# Patient Record
Sex: FEMALE | Race: WHITE | Employment: OTHER | URBAN - METROPOLITAN AREA
[De-identification: names, ages, dates, MRNs, and addresses within clinical notes are randomized per-mention and may not be internally consistent; named-entity substitution may affect disease eponyms.]

---

## 2017-02-06 LAB
CREATININE, EXTERNAL: 0.8
LDL-C, EXTERNAL: 242
Lab: >60

## 2017-02-09 ENCOUNTER — HOSPITAL ENCOUNTER (OUTPATIENT)
Dept: ULTRASOUND IMAGING | Age: 67
Discharge: HOME | End: 2017-02-09
Attending: FAMILY MEDICINE
Payer: COMMERCIAL

## 2017-02-09 DIAGNOSIS — E04.9 GOITER: ICD-10-CM

## 2017-02-09 PROCEDURE — 76536 US EXAM OF HEAD AND NECK: CPT

## 2017-04-04 ENCOUNTER — OFFICE VISIT (OUTPATIENT)
Dept: ENDOCRINOLOGY | Age: 67
End: 2017-04-04

## 2017-04-04 VITALS
WEIGHT: 145.9 LBS | BODY MASS INDEX: 24.31 KG/M2 | SYSTOLIC BLOOD PRESSURE: 124 MMHG | DIASTOLIC BLOOD PRESSURE: 83 MMHG | HEIGHT: 65 IN | HEART RATE: 71 BPM

## 2017-04-04 DIAGNOSIS — E04.2 MULTINODULAR GOITER: Primary | ICD-10-CM

## 2017-04-04 LAB — TSH SERPL DL<=0.005 MIU/L-ACNC: 2.37 M[IU]/L

## 2017-04-04 RX ORDER — PRAVASTATIN SODIUM 10 MG/1
TABLET ORAL
COMMUNITY
End: 2018-12-10

## 2017-04-04 RX ORDER — CALCIUM CARBONATE 600 MG
600 TABLET ORAL DAILY
COMMUNITY

## 2017-04-04 RX ORDER — LANOLIN ALCOHOL/MO/W.PET/CERES
1 CREAM (GRAM) TOPICAL DAILY
COMMUNITY

## 2017-04-04 RX ORDER — GUAIFENESIN 100 MG/5ML
81 LIQUID (ML) ORAL DAILY
COMMUNITY

## 2017-04-04 NOTE — PROGRESS NOTES
CONSULTATION REQUESTED BY: Prabha Newton MD     REASON FOR CONSULT: Evaluation of thyroid nodule    CHIEF COMPLAINT: Thyroid nodule    HISTORY OF PRESENT ILLNESS:   Lulu Laura is a 61 y.o. female with a PMHx as noted below who was referred to our endocrinology clinic for evaluation of a thyroid nodule. Patient describes that she has had thyroid nodules followed for many years. Her doctor had initially palpated her neck and obtained an ultrasound for review which revealed a multinodular goiter. She has since had this followed with ultrasounds and the most recent one noted as below. Recent thyroid ultrasound was obtained and dated: 2/9/17  Findings:  Right lobe 4.8cm in length  Left lobe 5.6 cm in length  Right superior 10 x 7 x 11 mm nodule  Left lower lobe 1.8 x 1 x 1.6 cm nodule (was 1.5 cm in 2014)  No microcalcifications reported    Family history is not significant for thyroid nodules or thyroid cancers. Patient denies any history of radiation exposure. They deny any dysphagia or dyspnea. Review of thyroid labs dated: 2/6/17  TSH: 2.37  Patient denies symptoms of hyper or hypothyroidism. At this time they would like to further investigate the nature of the thyroid nodule. PAST MEDICAL/SURGICAL HISTORY:   Past Medical History:   Diagnosis Date    Colonic polyp     Goiter     Hyperlipidemia      Past Surgical History:   Procedure Laterality Date    HX COLONOSCOPY      HX KNEE ARTHROSCOPY         ALLERGIES:   No Known Allergies    MEDICATIONS ON ADMISSION:     Current Outpatient Prescriptions:     aspirin 81 mg chewable tablet, Take 81 mg by mouth daily. , Disp: , Rfl:     Omega-3 Fatty Acids (FISH OIL) 300 mg cap, Take  by mouth., Disp: , Rfl:     calcium carbonate (CALTREX) 600 mg calcium (1,500 mg) tablet, Take 600 mg by mouth two (2) times a day., Disp: , Rfl:     glucosamine-chondroitin (ARTHX) 500-400 mg cap, Take 1 Cap by mouth daily. , Disp: , Rfl:     pravastatin (PRAVACHOL) 10 mg tablet, Take  by mouth nightly., Disp: , Rfl:     SOCIAL HISTORY:   Social History     Social History    Marital status:      Spouse name: N/A    Number of children: N/A    Years of education: N/A     Occupational History    Not on file. Social History Main Topics    Smoking status: Never Smoker    Smokeless tobacco: Not on file    Alcohol use Not on file    Drug use: Not on file    Sexual activity: Not on file     Other Topics Concern    Not on file     Social History Narrative       FAMILY HISTORY:  Family History   Problem Relation Age of Onset    Hypertension Mother     Heart Disease Mother     Cancer Father     Diabetes Father     Heart Disease Father        REVIEW OF SYSTEMS: Complete ROS assessed and noted for that which is described above, all else are negative. Eyes: normal  ENT: normal  CVS: normal  Resp: normal  GI: normal  : normal  GYN: normal  Endocrine: normal  Integument: normal  Musculoskeletal: normal  Neuro: normal  Psych: normal      PHYSICAL EXAMINATION:    VITAL SIGNS:  Visit Vitals    /83 (BP 1 Location: Left arm, BP Patient Position: Sitting)    Pulse 71    Ht 5' 5\" (1.651 m)    Wt 145 lb 14.4 oz (66.2 kg)    BMI 24.28 kg/m2       GENERAL: NCAT, Sitting comfortably, NAD  EYES: EOMI, non-icteric, no proptosis  Ear/Nose/Throat: NCAT, no inflammation, thyroid gland is not appreciably large on exam, nodularity is not appreciated  LYMPH NODES: No LAD  CARDIOVASCULAR: S1 S2, RRR, No murmur, 2+ radial pulses  RESPIRATORY: CTA b/l, no wheeze/rales  GASTROINTESTINAL: NT, ND  MUSCULOSKELETAL: Normal ROM, no atrophy  SKIN: warm, no edema/rash/ or other skin changes  NEUROLOGIC: 5/5 power all extremities, AAOx3, no tremors  PSYCHIATRIC: Normal affect, Normal insight and judgement      REVIEW OF LABORATORY AND RADIOLOGY DATA:   Labs and documentation have been reviewed as described above. ASSESSMENT AND PLAN:   Kenzie Cameron is a 61 y.o. female with a PMHx as noted above who was referred to our endocrinology clinic for evaluation of a thyroid nodule. Multinodular goiter    Today we discussed the incidence of thyroid nodules in the community and the natural history of most thyroid nodules. We also discussed the proper workup and evaluation for thyroid nodules and management based upon the clinical features and sonographic patterns which may suggest benign vs more concerning nodules. We discussed the role of evaluating thyroid function to assess the impact of thyroid hormone levels on nodular size and to evaluate for autonomous toxic nodules, as this would also affect management decisions. Ultimately we also discussed the role of thyroid biopsies should that become indicated. Today based on our discussion, and having reviewed the images together in clinic, we have decided to move forward and biopsy the 1.8cm nodule in the left lower lobe as it has slowly grown over the years, and this would offer her some reassurance as we continue surveillance in the future. The nodule is not creating any discomfort and her thyroid function remains normal.    Plan:  Biopsy of 1.8 cm left lower lobe nodule  Plan for 1 year f/u if result is benign, will schedule the f/u ultrasound after result of biopsy  Patient advised to call with any concerns    Dionisio Cartwright.  [id number] [facility]

## 2017-04-04 NOTE — PATIENT INSTRUCTIONS
Regarding thyroid nodules as according to the [employer]:    Thyroid Nodule FAQs    WHAT ARE THE SYMPTOMS OF A THYROID NODULE? Most thyroid nodules do not cause symptoms. Often, thyroid nodules are discovered incidentally during a routine physical examination or on imaging tests like CT scans or neck ultrasound done for completely unrelated reasons. Occasionally, patients themselves find thyroid nodules by noticing a lump in their neck while looking in a mirror, buttoning their collar, or fastening a necklace. Abnormal thyroid function tests may occasionally be the reason a thyroid nodule is found. Thyroid nodules may produce excess amounts of thyroid hormone causing hyperthyroidism (see the Hyperthyroidism brochure). However, most thyroid nodules, including those that cancerous, are actually non-functioning, meaning tests like TSH are normal. Rarely, patients with thyroid nodules may complain of pain in the neck, jaw, or ear. If a nodule is large enough to compress the windpipe or esophagus, it may cause difficulty with breathing, swallowing, or cause a tickle in the throat. Even less commonly, hoarseness can be caused if the nodule invades the nerve that controls the vocal cords but this is usually related to thyroid cancer. The important points to remember are the following:  Thyroid nodules generally do not cause symptoms. Thyroid tests are most typically normal--even when cancer is present in a nodule. The best way to find a thyroid nodule is to make sure your doctor checks your neck! WHAT CAUSES THYROID NODULES AND HOW COMMON ARE THEY? We do not know what causes most thyroid nodules but they are extremely common. By age 61, about one-half of all people have a thyroid nodule that can be found either through examination or with imaging. Fortunately, over 90% of such nodules are benign.  Hashimotos thyroiditis, which is the most common cause of hypothyroidism (see Hypothyroidism brochure), is associated with an increased risk of thyroid nodules. Iodine deficiency, which is very uncommon in the [place], is also known to cause thyroid nodules. HOW IS A THYROID NODULE EVALUATED AND DIAGNOSED? Once the nodule is discovered, your doctor will try to determine whether the rest of your thyroid is healthy or whether the entire thyroid gland has been affected by a more general condition such as hyperthyroidism or hypothyroidism. Your physician will feel the thyroid to see whether the entire gland is enlarged and whether a single or multiple nodules are present. The initial laboratory tests may include measurement of thyroid hormone (thyroxine, or T4) and thyroid-stimulating hormone (TSH) in your blood to determine whether your thyroid is functioning normally. Since its usually not possible to determine whether a thyroid nodule is cancerous by physical examination and blood tests alone, the evaluation of the thyroid nodules often includes specialized tests such as thyroid ultrasonography and fine needle biopsy. THYROID ULTRASOUND:  Thyroid ultrasound is a key tool for thyroid nodule evaluation. It uses high-frequency sound waves to obtain a picture of the thyroid. This very accurate test can easily determine if a nodule is solid or fluid filled (cystic), and it can determine the precise size of the nodule. Ultrasound can help identify suspicious nodules since some ultrasound characteristics of thyroid nodules are more frequent in thyroid cancer than in noncancerous nodules. Thyroid ultrasound can identify nodules that are too small to feel during a physical examination. Ultrasound can also be used to accurately guide a needle directly into a nodule when your doctor thinks a fine needle biopsy is needed.  Once the initial evaluation is completed, thyroid ultrasound can be used to keep an eye on thyroid nodules that do not require surgery to determine if they are growing or shrinking over time. The ultrasound is a painless test which many doctors may be able to perform in their own office. THYROID FINE NEEDLE ASPIRATION BIOPSY (FNA OR FNAB):  A fine needle biopsy of a thyroid nodule may sound frightening, but the needle used is very small and a local anesthetic may not even be necessary. This simple procedure is often done in the doctors office. Sometimes, medications like blood thinners may need to be stopped for a few days before to the procedure. Otherwise, the biopsy does not usually require any other special preparation (no fasting). Patients typically return home or to work after the biopsy without even needing a band-aid! For a fine needle biopsy, your doctor will use a very thin needle to withdraw cells from the thyroid nodule. Ordinarily, several samples will be taken from different parts of the nodule to give your doctor the best chance of finding cancerous cells if they are present. The cells are then examined under a microscope by a pathologist.    The report of a thyroid fine needle biopsy will usually indicate one of the following findings: The nodule is benign (noncancerous). This result is obtained in up to 80% of biopsies. The risk of overlooking a cancer when the biopsy is benign is generally less than 3 in 100 tests or 3%. This is even lower when the biopsy is reviewed by an experienced pathologist at a [facility] center. Generally, benign thyroid nodules do not need to be removed unless they are causing symptoms like choking or difficulty swallowing. Follow up ultrasound exams are important. Occasionally, another biopsy may be required in the future, especially if the nodule grows over time. The nodule is malignant (cancerous) or suspicious for malignancy . malignant result is obtained in about 5% of biopsies and is most often due to papillary cancer, which is the most common type of thyroid cancer. A suspicious biopsy has a 50-75% risk of cancer in the nodule. These diagnoses require surgical removal of the thyroid after consultation with your endocrinologist and surgeon. The nodule is indeterminate. This is actually a group of several diagnoses that may occur in up to 20% of cases. An Indeterminate finding means that even though an adequate number of cells was removed during the fine needle biopsy, examination with a microscope cannot reliably classify the result as benign or cancer. The biopsy may be indeterminate because the nodule is described as a Follicular Lesion. These nodules are cancerous 20- 30% of the time. However, the diagnosis can only be made by surgery. Since the odds that the nodule is not a cancer are much better here (70-80%), only the side of the thyroid with the nodule is usually removed. If a cancer is found, the remaining thyroid gland usually must be removed as well. If the surgery confirms that no cancer is present, no additional surgery to complete the thyroidectomy is necessary. The biopsy may also be indeterminate because the cells from the nodule have features that cannot be placed in one of the other diagnostic categories. This diagnosis is called atypia, or a follicular lesion of undetermined significance. Diagnoses in this category will contain cancer rarely, so repeat evaluation with FNA or surgical biopsy to remove half of the thyroid containing the nodule is usually recommended. The biopsy may also be nondiagnostic or inadequate. This result is obtained in less than 5% of cases when an ultrasound is used to guide the FNA. This result indicates that not enough cells were obtained to make a diagnosis but is a common result if the nodule is a cyst. These nodules may require reevaluation with second fine needle biopsy, or may need to be removed surgically depending on the clinical judgment of your doctor. NUCLEAR THYROID SCANS:  Nuclear scanning of the thyroid was frequently done in the past to evaluate thyroid nodules.  However, use of thyroid ultrasound and biopsy have proven so accurate and sensitive, nuclear scanning is no longer considered a first-line method of evaluation. Nuclear scanning still has an important role in the evaluation of rare nodules that cause hyperthyroidism. In this situation, the nuclear thyroid scan may suggest that no further evaluation or biopsy is needed. In most other situations, neck ultrasound and biopsy remain the best and most accurate way to evaluate all types of thyroid nodules. MOLECULAR DIAGNOSTICS:  Can any other tests assist in evaluation of thyroid nodules? Yes! While still mainly research tests and not widely available, new tests that examine genes in the DNA of thyroid nodules are being developed. These tests can provide helpful information about whether cancer may be present or absent. These tests are particularly helpful when the specimen evaluated by the pathologist is indeterminate. These specialized tests are done on samples obtained during the normal biopsy process. There are also specialized blood tests that can assist in the evaluation of thyroid nodules. These are currently available only at highly specialized medical centers, however, their availability is increasing rapidly. Ask your doctor if these tests are available and might be helpful for evaluating your thyroid nodule. HOW ARE THYROID NODULES TREATED? All thyroid nodules that are found to contain a thyroid cancer, or that are highly suspicious of containing a cancer, should be removed surgically by an experienced thyroid surgeon. Most thyroid cancers are curable and rarely cause life-threatening problems (see Thyroid Cancer brochure). Thyroid nodules that are benign by FNA or too small to biopsy should still be watched closely with ultrasound examination every 6 to 12 months and annual physical examination by your doctor.  Surgery may still be recommended even for a nodule that is benign by FNA if it continues to grow, or develops worrisome features on ultrasound over the course of follow up.

## 2017-04-04 NOTE — MR AVS SNAPSHOT
Visit Information Date & Time Provider Department Dept. Phone Encounter #  
 4/4/2017  8:30 AM Stu Amos, [address] [facility] Diabetes and Endocrinology [phone] Follow-up Instructions Return in about 1 year (around 4/4/2018). Upcoming Health Maintenance Date Due Hepatitis C Screening 1950 DTaP/Tdap/Td series (1 - Tdap) 8/15/1973 PAP AKA CERVICAL CYTOLOGY 8/15/1973 BREAST CANCER SCRN MAMMOGRAM 8/15/2002 FOBT Q 1 YEAR AGE 50-75 8/15/2002 ZOSTER VACCINE AGE 60> 8/15/2012 INFLUENZA AGE 9 TO ADULT 8/1/2016 Allergies as of 4/4/2017  Review Complete On: 4/4/2017 By: Stu Amos MD  
 No Known Allergies Current Immunizations  Never Reviewed No immunizations on file. Not reviewed this visit You Were Diagnosed With   
  
 Codes Comments Multinodular goiter    -  Primary ICD-10-CM: E04.2 ICD-9-CM: 231. 1 Vitals BP Pulse Height(growth percentile) Weight(growth percentile) BMI Smoking Status 124/83 (BP 1 Location: Left arm, BP Patient Position: Sitting) 71 5' 5\" (1.651 m) 145 lb 14.4 oz (66.2 kg) 24.28 kg/m2 Never Smoker BMI and BSA Data Body Mass Index Body Surface Area  
 24.28 kg/m 2 1.74 m 2 Preferred Pharmacy Pharmacy Name Phone [phone] [facility], [facility] [phone] Your Updated Medication List  
  
   
This list is accurate as of: 4/4/17  9:19 AM.  Always use your most recent med list.  
  
  
  
  
 aspirin 81 mg chewable tablet Take 81 mg by mouth daily. calcium carbonate 600 mg calcium (1,500 mg) tablet Commonly known as:  Darryn Nguyen Take 600 mg by mouth two (2) times a day. FISH  mg Cap Generic drug:  Omega-3 Fatty Acids Take  by mouth. glucosamine-chondroitin 500-400 mg Cap Commonly known as:  [facility] Take 1 Cap by mouth daily. pravastatin 10 mg tablet Commonly known as:  PRAVACHOL Take  by mouth nightly. Follow-up Instructions Return in about 1 year (around 4/4/2018). To-Do List   
 04/04/2017 Imaging:  US GUIDE FINE NDL ASP W IMAGE Patient Instructions Regarding thyroid nodules as according to the American Thyroid Association: 
 
Thyroid Nodule FAQs WHAT ARE THE SYMPTOMS OF A THYROID NODULE? Most thyroid nodules do not cause symptoms. Often, thyroid nodules are discovered incidentally during a routine physical examination or on imaging tests like CT scans or neck ultrasound done for completely unrelated reasons. Occasionally, patients themselves find thyroid nodules by noticing a lump in their neck while looking in a mirror, buttoning their collar, or fastening a necklace. Abnormal thyroid function tests may occasionally be the reason a thyroid nodule is found. Thyroid nodules may produce excess amounts of thyroid hormone causing hyperthyroidism (see the Hyperthyroidism brochure). However, most thyroid nodules, including those that cancerous, are actually non-functioning, meaning tests like TSH are normal. Rarely, patients with thyroid nodules may complain of pain in the neck, jaw, or ear. If a nodule is large enough to compress the windpipe or esophagus, it may cause difficulty with breathing, swallowing, or cause a tickle in the throat. Even less commonly, hoarseness can be caused if the nodule invades the nerve that controls the vocal cords but this is usually related to thyroid cancer. The important points to remember are the following: 
Thyroid nodules generally do not cause symptoms. Thyroid tests are most typically normaleven when cancer is present in a nodule. The best way to find a thyroid nodule is to make sure your doctor checks your neck! WHAT CAUSES THYROID NODULES AND HOW COMMON ARE THEY?  
We do not know what causes most thyroid nodules but they are extremely common. By age 61, about one-half of all people have a thyroid nodule that can be found either through examination or with imaging. Fortunately, over 90% of such nodules are benign. Hashimotos thyroiditis, which is the most common cause of hypothyroidism (see Hypothyroidism brochure), is associated with an increased risk of thyroid nodules. Iodine deficiency, which is very uncommon in the [place], is also known to cause thyroid nodules. HOW IS A THYROID NODULE EVALUATED AND DIAGNOSED? Once the nodule is discovered, your doctor will try to determine whether the rest of your thyroid is healthy or whether the entire thyroid gland has been affected by a more general condition such as hyperthyroidism or hypothyroidism. Your physician will feel the thyroid to see whether the entire gland is enlarged and whether a single or multiple nodules are present. The initial laboratory tests may include measurement of thyroid hormone (thyroxine, or T4) and thyroid-stimulating hormone (TSH) in your blood to determine whether your thyroid is functioning normally. Since its usually not possible to determine whether a thyroid nodule is cancerous by physical examination and blood tests alone, the evaluation of the thyroid nodules often includes specialized tests such as thyroid ultrasonography and fine needle biopsy. THYROID ULTRASOUND: 
Thyroid ultrasound is a key tool for thyroid nodule evaluation. It uses high-frequency sound waves to obtain a picture of the thyroid. This very accurate test can easily determine if a nodule is solid or fluid filled (cystic), and it can determine the precise size of the nodule. Ultrasound can help identify suspicious nodules since some ultrasound characteristics of thyroid nodules are more frequent in thyroid cancer than in noncancerous nodules. Thyroid ultrasound can identify nodules that are too small to feel during a physical examination.  Ultrasound can also be used to accurately guide a needle directly into a nodule when your doctor thinks a fine needle biopsy is needed. Once the initial evaluation is completed, thyroid ultrasound can be used to keep an eye on thyroid nodules that do not require surgery to determine if they are growing or shrinking over time. The ultrasound is a painless test which many doctors may be able to perform in their own office. THYROID FINE NEEDLE ASPIRATION BIOPSY (FNA OR FNAB): 
A fine needle biopsy of a thyroid nodule may sound frightening, but the needle used is very small and a local anesthetic may not even be necessary. This simple procedure is often done in the doctors office. Sometimes, medications like blood thinners may need to be stopped for a few days before to the procedure. Otherwise, the biopsy does not usually require any other special preparation (no fasting). Patients typically return home or to work after the biopsy without even needing a band-aid! For a fine needle biopsy, your doctor will use a very thin needle to withdraw cells from the thyroid nodule. Ordinarily, several samples will be taken from different parts of the nodule to give your doctor the best chance of finding cancerous cells if they are present. The cells are then examined under a microscope by a pathologist. 
 
The report of a thyroid fine needle biopsy will usually indicate one of the following findings: The nodule is benign (noncancerous). This result is obtained in up to 80% of biopsies. The risk of overlooking a cancer when the biopsy is benign is generally less than 3 in 100 tests or 3%. This is even lower when the biopsy is reviewed by an experienced pathologist at a major medical center. Generally, benign thyroid nodules do not need to be removed unless they are causing symptoms like choking or difficulty swallowing. Follow up ultrasound exams are important.  Occasionally, another biopsy may be required in the future, especially if the nodule grows over time. The nodule is malignant (cancerous) or suspicious for malignancy . malignant result is obtained in about 5% of biopsies and is most often due to papillary cancer, which is the most common type of thyroid cancer. A suspicious biopsy has a 50-75% risk of cancer in the nodule. These diagnoses require surgical removal of the thyroid after consultation with your endocrinologist and surgeon. The nodule is indeterminate. This is actually a group of several diagnoses that may occur in up to 20% of cases. An Indeterminate finding means that even though an adequate number of cells was removed during the fine needle biopsy, examination with a microscope cannot reliably classify the result as benign or cancer. The biopsy may be indeterminate because the nodule is described as a Follicular Lesion. These nodules are cancerous 20- 30% of the time. However, the diagnosis can only be made by surgery. Since the odds that the nodule is not a cancer are much better here (70-80%), only the side of the thyroid with the nodule is usually removed. If a cancer is found, the remaining thyroid gland usually must be removed as well. If the surgery confirms that no cancer is present, no additional surgery to complete the thyroidectomy is necessary. The biopsy may also be indeterminate because the cells from the nodule have features that cannot be placed in one of the other diagnostic categories. This diagnosis is called atypia, or a follicular lesion of undetermined significance. Diagnoses in this category will contain cancer rarely, so repeat evaluation with FNA or surgical biopsy to remove half of the thyroid containing the nodule is usually recommended. The biopsy may also be nondiagnostic or inadequate. This result is obtained in less than 5% of cases when an ultrasound is used to guide the FNA.  This result indicates that not enough cells were obtained to make a diagnosis but is a common result if the nodule is a cyst. These nodules may require reevaluation with second fine needle biopsy, or may need to be removed surgically depending on the clinical judgment of your doctor. NUCLEAR THYROID SCANS: 
Nuclear scanning of the thyroid was frequently done in the past to evaluate thyroid nodules. However, use of thyroid ultrasound and biopsy have proven so accurate and sensitive, nuclear scanning is no longer considered a first-line method of evaluation. Nuclear scanning still has an important role in the evaluation of rare nodules that cause hyperthyroidism. In this situation, the nuclear thyroid scan may suggest that no further evaluation or biopsy is needed. In most other situations, neck ultrasound and biopsy remain the best and most accurate way to evaluate all types of thyroid nodules. MOLECULAR DIAGNOSTICS: 
Can any other tests assist in evaluation of thyroid nodules? Yes! While still mainly research tests and not widely available, new tests that examine genes in the DNA of thyroid nodules are being developed. These tests can provide helpful information about whether cancer may be present or absent. These tests are particularly helpful when the specimen evaluated by the pathologist is indeterminate. These specialized tests are done on samples obtained during the normal biopsy process. There are also specialized blood tests that can assist in the evaluation of thyroid nodules. These are currently available only at highly specialized medical centers, however, their availability is increasing rapidly. Ask your doctor if these tests are available and might be helpful for evaluating your thyroid nodule. HOW ARE THYROID NODULES TREATED? All thyroid nodules that are found to contain a thyroid cancer, or that are highly suspicious of containing a cancer, should be removed surgically by an experienced thyroid surgeon.  Most thyroid cancers are curable and rarely cause life-threatening problems (see Thyroid Cancer brochure). Thyroid nodules that are benign by FNA or too small to biopsy should still be watched closely with ultrasound examination every 6 to 12 months and annual physical examination by your doctor. Surgery may still be recommended even for a nodule that is benign by FNA if it continues to grow, or develops worrisome features on ultrasound over the course of follow up. Introducing [facility]! [facility] introduces [employer] patient portal. Now you can access parts of your medical record, email your doctor's office, and request medication refills online. 1. In your internet browser, go to [email or web address] 2. Click on the First Time User? Click Here link in the Sign In box. You will see the New Member Sign Up page. 3. Enter your [employer] Access Code exactly as it appears below. You will not need to use this code after youve completed the sign-up process. If you do not sign up before the expiration date, you must request a new code. · [employer] Access Code: [email or web address] Expires: 5/7/2017  6:03 PM 
 
4. Enter the last four digits of your Social Security Number (xxxx) and Date of Birth (mm/dd/yyyy) as indicated and click Submit. You will be taken to the next sign-up page. 5. Create a [employer] ID. This will be your [employer] login ID and cannot be changed, so think of one that is secure and easy to remember. 6. Create a [employer] password. You can change your password at any time. 7. Enter your Password Reset Question and Answer. This can be used at a later time if you forget your password. 8. Enter your e-mail address. You will receive e-mail notification when new information is available in [address] 9. Click Sign Up. You can now view and download portions of your medical record. 10. Click the Download Summary menu link to download a portable copy of your medical information. If you have questions, please visit the Frequently Asked Questions section of the [employer] website. Remember, [employer] is NOT to be used for urgent needs. For medical emergencies, dial 911. Now available from your iPhone and Android! Please provide this summary of care documentation to your next provider. Your primary care clinician is listed as Leidy Esposito. If you have any questions after today's visit, please call [phone].

## 2017-04-18 ENCOUNTER — HOSPITAL ENCOUNTER (OUTPATIENT)
Dept: ULTRASOUND IMAGING | Age: 67
Discharge: HOME | End: 2017-04-18
Attending: INTERNAL MEDICINE
Payer: COMMERCIAL

## 2017-04-18 DIAGNOSIS — E04.2 MULTINODULAR GOITER: ICD-10-CM

## 2017-04-18 PROCEDURE — 10022 US GUIDE FINE NDL ASP W IMAGE: CPT

## 2017-04-18 PROCEDURE — 74011000250 HC RX REV CODE- 250: Performed by: RADIOLOGY

## 2017-04-18 RX ORDER — LIDOCAINE HYDROCHLORIDE 10 MG/ML
10 INJECTION INFILTRATION; PERINEURAL
Status: COMPLETED | OUTPATIENT
Start: 2017-04-18 | End: 2017-04-18

## 2017-04-18 RX ADMIN — LIDOCAINE HYDROCHLORIDE 3 ML: 10 INJECTION, SOLUTION INFILTRATION; PERINEURAL at 14:46

## 2017-04-18 NOTE — DISCHARGE INSTRUCTIONS
[address] [facility]  Department of Radiology  [phone] Ultrasound Department  [phone] Emergency Department    BIOPSY DISCHARGE INSTRUCTIONS for Kenzie Cameron on 4/18/17    General Instructions:  - A biopsy is the removal of a small piece of tissue for microscopic examination or testing.  - Healthy tissue can be obtained for the purpose of tissue-type matching for transplants. - Unhealthy tissues are more commonly biopsied to diagnose disease. General Biopsy:  - A mass can grow in any area of the body, and we are taking a specimen as ordered by your doctor. - The risks are the same. They include bleeding, pain, and infection. Home Care Instructions:  - You may resume your regular diet and medication regimen. - You may use over the counter acetaminophen (Tylenol) or ibuprofen (Advil) for the soreness. - You may apply an ice pack to the affected area for 20-30 minutes at time for the first 24 hours. -- After that, you may apply a heat pack. - You may remove the bandaid(s) tonight. - You may take a shower tonight. - Please keep the site clean and dry for 24-48 hours. - Do not soak in any kind of water (bath tub, hot tub, pool, ocean, etc) for 24-48 hours. - Do not participate in any kind of activity making you vigorously sweat for 24-48 hours. - Do not use any moisturizer or makeup on the site for 24-48 hours. Call If:  - You should call Dr. Omega Dumont if you have any questions or concerns about the biopsy site. - Call if you should have increased pain, fever, redness, drainage, or bleeding more than a small spot on the bandage. Follow-Up Instructions:  - Please see Dr. Omega Dumont as he has requested.     Discharging Technologists: Lizett Kramer RDMS and Hussein Jaramillo RDMS, RVT

## 2017-04-25 ENCOUNTER — DOCUMENTATION ONLY (OUTPATIENT)
Dept: ENDOCRINOLOGY | Age: 67
End: 2017-04-25

## 2017-04-25 DIAGNOSIS — E04.2 MULTIPLE THYROID NODULES: Primary | ICD-10-CM

## 2017-04-25 NOTE — PROGRESS NOTES
Received patients thyroid nodule biopsy,   1.8cm left lower lobe nodule. Result: Benign  Afirma GEC thus not performed,    I have called the patient and advised her of the result. Plan to resume her 1 year f/u with thyroid ultrasound.

## 2018-12-10 RX ORDER — ROSUVASTATIN CALCIUM 10 MG/1
10 TABLET, COATED ORAL DAILY
COMMUNITY

## 2018-12-10 NOTE — PERIOP NOTES
[facility] Ambulatory Surgery Unit Pre-operative Instructions for Endo Procedures Procedure Date  12/17/18            Tentative Arrival Time 3128 1. On the day of your procedure, please report to the Ambulatory Surgery Unit Registration Desk and sign in at your designated time. The Ambulatory Surgery Unit is located in [facility] on the [facility] side of the [facility] across from the [address] [place] [address]. Please have all of your health insurance cards and a photo ID. 2. You must have someone with you to drive you home, as you should not drive a car for 24 hours following anesthesia. Please make arrangements for a responsible adult friend or family member to stay with you for at least the first 24 hours after your procedure. 3. Do not have anything to eat or drink (including water, gum, mints, coffee, juice) after 11:59 PM 12/16/18 Sunday. This may not apply to medications prescribed by your physician. (Please note below the special instructions with medications to take the morning of your procedure.) 4. If applicable, follow the clear liquid diet and bowel prep instructions provided by your physician's office. If you do not have this information, or have any questions, please contact your physician's office. 5. We recommend you do not drink any alcoholic beverages for 24 hours before and after your procedure. 6. Contact your surgeons office for instructions on the following medications: non-steroidal anti-inflammatory drugs (i.e. Advil, Aleve), vitamins, and supplements. (Some surgeons will want you to stop these medications prior to surgery and others may allow you to take them) **If you are currently taking Plavix, Coumadin, Aspirin and/or other blood-thinning agents, contact your surgeon for instructions. ** Your surgeon will partner with the physician prescribing these medications to determine if it is safe to stop or if you need to continue taking.  Please do not stop taking these medications without instructions from your surgeon. 7. In an effort to help prevent surgical site infection, we ask that you shower with an anti-bacterial soap (i.e. Dial or Safeguard) on the morning of your procedure. Do not apply any lotions, powders, or deodorants after showering. 8. Wear comfortable clothes. Wear glasses instead of contacts. Do not bring any jewelry or money (other than copays or fees as instructed). Do not wear make-up, particularly mascara, the morning of your procedure. Wear your hair loose or down, no ponytails, buns, sammy pins or clips. All body piercings must be removed. 9. You should understand that if you do not follow these instructions your procedure may be cancelled. If your physical condition changes (i.e. fever, cold or flu) please contact your surgeon as soon as possible. 10. It is important that you be on time. If a situation occurs where you may be late, or if you have any questions or problems, please call [phone]. 11. Your procedure time may be subject to change. You will receive a phone call the day prior to confirm your arrival time. Special Instructions: Take all medications and inhalers, as prescribed, on the morning of surgery with a sip of water EXCEPT: None I understand a pre-operative phone call will be made to verify my procedure time. In the event that I am not available, I give permission for a message to be left on my answering service and/or with another person? Yes Helen-op instructions given over the phone and the patient verbalized an understanding 
 
 ___________________      ___________________      ___________________ 
(Signature of Patient)          (Witness)                   (Date and Time)

## 2018-12-14 ENCOUNTER — ANESTHESIA EVENT (OUTPATIENT)
Dept: SURGERY | Age: 68
End: 2018-12-14
Payer: MEDICARE

## 2018-12-17 ENCOUNTER — ANESTHESIA (OUTPATIENT)
Dept: SURGERY | Age: 68
End: 2018-12-17
Payer: MEDICARE

## 2018-12-17 ENCOUNTER — HOSPITAL ENCOUNTER (OUTPATIENT)
Age: 68
Setting detail: OUTPATIENT SURGERY
Discharge: HOME | End: 2018-12-17
Attending: INTERNAL MEDICINE | Admitting: INTERNAL MEDICINE
Payer: MEDICARE

## 2018-12-17 VITALS
DIASTOLIC BLOOD PRESSURE: 72 MMHG | BODY MASS INDEX: 23.14 KG/M2 | HEART RATE: 66 BPM | WEIGHT: 144 LBS | RESPIRATION RATE: 18 BRPM | HEIGHT: 66 IN | TEMPERATURE: 97.7 F | SYSTOLIC BLOOD PRESSURE: 157 MMHG | OXYGEN SATURATION: 100 %

## 2018-12-17 PROCEDURE — 76060000073 HC AMB SURG ANES FIRST 0.5 HR: Performed by: INTERNAL MEDICINE

## 2018-12-17 PROCEDURE — 77030021352 HC CBL LD SYS DISP COVD -B: Performed by: INTERNAL MEDICINE

## 2018-12-17 PROCEDURE — 76210000046 HC AMBSU PH II REC FIRST 0.5 HR: Performed by: INTERNAL MEDICINE

## 2018-12-17 PROCEDURE — 77030020255 HC SOL INJ LR 1000ML BG: Performed by: INTERNAL MEDICINE

## 2018-12-17 PROCEDURE — 76030000002 HC AMB SURG OR TIME FIRST 0.: Performed by: INTERNAL MEDICINE

## 2018-12-17 PROCEDURE — 74011250636 HC RX REV CODE- 250/636

## 2018-12-17 PROCEDURE — 76210000040 HC AMBSU PH I REC FIRST 0.5 HR: Performed by: INTERNAL MEDICINE

## 2018-12-17 PROCEDURE — 74011250636 HC RX REV CODE- 250/636: Performed by: ANESTHESIOLOGY

## 2018-12-17 RX ORDER — SODIUM CHLORIDE 0.9 % (FLUSH) 0.9 %
5-10 SYRINGE (ML) INJECTION EVERY 8 HOURS
Status: DISCONTINUED | OUTPATIENT
Start: 2018-12-17 | End: 2018-12-17 | Stop reason: HOSPADM

## 2018-12-17 RX ORDER — DEXTROMETHORPHAN/PSEUDOEPHED 2.5-7.5/.8
30 DROPS ORAL
Status: DISCONTINUED | OUTPATIENT
Start: 2018-12-17 | End: 2018-12-17 | Stop reason: HOSPADM

## 2018-12-17 RX ORDER — SODIUM CHLORIDE 0.9 % (FLUSH) 0.9 %
5-10 SYRINGE (ML) INJECTION AS NEEDED
Status: DISCONTINUED | OUTPATIENT
Start: 2018-12-17 | End: 2018-12-17 | Stop reason: HOSPADM

## 2018-12-17 RX ORDER — FENTANYL CITRATE 50 UG/ML
25 INJECTION, SOLUTION INTRAMUSCULAR; INTRAVENOUS
Status: DISCONTINUED | OUTPATIENT
Start: 2018-12-17 | End: 2018-12-17 | Stop reason: HOSPADM

## 2018-12-17 RX ORDER — LIDOCAINE HYDROCHLORIDE 20 MG/ML
INJECTION, SOLUTION EPIDURAL; INFILTRATION; INTRACAUDAL; PERINEURAL AS NEEDED
Status: DISCONTINUED | OUTPATIENT
Start: 2018-12-17 | End: 2018-12-17 | Stop reason: HOSPADM

## 2018-12-17 RX ORDER — LIDOCAINE HYDROCHLORIDE 10 MG/ML
0.1 INJECTION, SOLUTION EPIDURAL; INFILTRATION; INTRACAUDAL; PERINEURAL AS NEEDED
Status: DISCONTINUED | OUTPATIENT
Start: 2018-12-17 | End: 2018-12-17 | Stop reason: HOSPADM

## 2018-12-17 RX ORDER — ONDANSETRON 2 MG/ML
4 INJECTION INTRAMUSCULAR; INTRAVENOUS AS NEEDED
Status: DISCONTINUED | OUTPATIENT
Start: 2018-12-17 | End: 2018-12-17 | Stop reason: HOSPADM

## 2018-12-17 RX ORDER — PROPOFOL 10 MG/ML
INJECTION, EMULSION INTRAVENOUS AS NEEDED
Status: DISCONTINUED | OUTPATIENT
Start: 2018-12-17 | End: 2018-12-17 | Stop reason: HOSPADM

## 2018-12-17 RX ORDER — MIDAZOLAM HYDROCHLORIDE 1 MG/ML
.25-5 INJECTION, SOLUTION INTRAMUSCULAR; INTRAVENOUS
Status: DISCONTINUED | OUTPATIENT
Start: 2018-12-17 | End: 2018-12-17 | Stop reason: HOSPADM

## 2018-12-17 RX ORDER — EPINEPHRINE 0.1 MG/ML
1 INJECTION INTRACARDIAC; INTRAVENOUS
Status: DISCONTINUED | OUTPATIENT
Start: 2018-12-17 | End: 2018-12-17 | Stop reason: HOSPADM

## 2018-12-17 RX ORDER — ATROPINE SULFATE 0.1 MG/ML
0.5 INJECTION INTRAVENOUS
Status: DISCONTINUED | OUTPATIENT
Start: 2018-12-17 | End: 2018-12-17 | Stop reason: HOSPADM

## 2018-12-17 RX ORDER — DIPHENHYDRAMINE HYDROCHLORIDE 50 MG/ML
12.5 INJECTION, SOLUTION INTRAMUSCULAR; INTRAVENOUS AS NEEDED
Status: DISCONTINUED | OUTPATIENT
Start: 2018-12-17 | End: 2018-12-17 | Stop reason: HOSPADM

## 2018-12-17 RX ORDER — DEXTROMETHORPHAN/PSEUDOEPHED 2.5-7.5/.8
1.2 DROPS ORAL
Status: DISCONTINUED | OUTPATIENT
Start: 2018-12-17 | End: 2018-12-17 | Stop reason: HOSPADM

## 2018-12-17 RX ORDER — NALOXONE HYDROCHLORIDE 0.4 MG/ML
0.4 INJECTION, SOLUTION INTRAMUSCULAR; INTRAVENOUS; SUBCUTANEOUS
Status: DISCONTINUED | OUTPATIENT
Start: 2018-12-17 | End: 2018-12-17 | Stop reason: HOSPADM

## 2018-12-17 RX ORDER — SODIUM CHLORIDE 9 MG/ML
75 INJECTION, SOLUTION INTRAVENOUS CONTINUOUS
Status: DISCONTINUED | OUTPATIENT
Start: 2018-12-17 | End: 2018-12-17 | Stop reason: HOSPADM

## 2018-12-17 RX ORDER — SODIUM CHLORIDE, SODIUM LACTATE, POTASSIUM CHLORIDE, CALCIUM CHLORIDE 600; 310; 30; 20 MG/100ML; MG/100ML; MG/100ML; MG/100ML
25 INJECTION, SOLUTION INTRAVENOUS CONTINUOUS
Status: DISCONTINUED | OUTPATIENT
Start: 2018-12-17 | End: 2018-12-17 | Stop reason: HOSPADM

## 2018-12-17 RX ORDER — FLUMAZENIL 0.1 MG/ML
0.2 INJECTION INTRAVENOUS
Status: DISCONTINUED | OUTPATIENT
Start: 2018-12-17 | End: 2018-12-17 | Stop reason: HOSPADM

## 2018-12-17 RX ADMIN — PROPOFOL 50 MG: 10 INJECTION, EMULSION INTRAVENOUS at 07:41

## 2018-12-17 RX ADMIN — LIDOCAINE HYDROCHLORIDE 60 MG: 20 INJECTION, SOLUTION EPIDURAL; INFILTRATION; INTRACAUDAL; PERINEURAL at 07:38

## 2018-12-17 RX ADMIN — PROPOFOL 50 MG: 10 INJECTION, EMULSION INTRAVENOUS at 07:47

## 2018-12-17 RX ADMIN — PROPOFOL 50 MG: 10 INJECTION, EMULSION INTRAVENOUS at 07:51

## 2018-12-17 RX ADMIN — PROPOFOL 50 MG: 10 INJECTION, EMULSION INTRAVENOUS at 07:38

## 2018-12-17 RX ADMIN — PROPOFOL 50 MG: 10 INJECTION, EMULSION INTRAVENOUS at 07:44

## 2018-12-17 RX ADMIN — SODIUM CHLORIDE, SODIUM LACTATE, POTASSIUM CHLORIDE, AND CALCIUM CHLORIDE 25 ML/HR: 600; 310; 30; 20 INJECTION, SOLUTION INTRAVENOUS at 06:52

## 2018-12-17 RX ADMIN — PROPOFOL 50 MG: 10 INJECTION, EMULSION INTRAVENOUS at 07:50

## 2018-12-17 NOTE — PERIOP NOTES
0800-Pt received to pacu. Pt remains sleepy, opens eyes to voice. Abd is soft. VSS on room air. 0805- at bedside. Dr Jasbir Mcclelland at bedside. Pt is waking and denies complaints. 0810-Pt is awake, drinking and tolerating water well. Discharge instruction reviewed. They voice no questions or concerns. 0823-Pt is awake and alert, denies any complaints. VSS. Abd is soft, she states she has passed  A little flatus. Patient meets discharge criteria and agrees she is ready to go home,   also agrees. PIV removed and assietd pt with dressing. 0830-Pt discharged home in stable condition via w/c to car.  Instructed to get up with assistance this am.

## 2018-12-17 NOTE — ANESTHESIA PREPROCEDURE EVALUATION
Anesthetic History     PONV          Review of Systems / Medical History  Patient summary reviewed, nursing notes reviewed and pertinent labs reviewed    Pulmonary  Within defined limits                 Neuro/Psych   Within defined limits           Cardiovascular              Hyperlipidemia    Exercise tolerance: >4 METS     GI/Hepatic/Renal  Within defined limits              Endo/Other        Arthritis and cancer     Other Findings   Comments: Hx of colon polyps           Physical Exam    Airway  Mallampati: I  TM Distance: 4 - 6 cm  Neck ROM: normal range of motion   Mouth opening: Normal     Cardiovascular    Rhythm: regular  Rate: normal         Dental    Dentition: Lower dentition intact and Upper dentition intact     Pulmonary  Breath sounds clear to auscultation               Abdominal  GI exam deferred       Other Findings            Anesthetic Plan    ASA: 2  Anesthesia type: total IV anesthesia    Monitoring Plan: BIS      Induction: Intravenous  Anesthetic plan and risks discussed with: Patient

## 2018-12-17 NOTE — H&P
Pre-endoscopy H and P    The patient was seen and examined in the room/pre-op holding area. The airway was assessed and documented. The problem list, past medical history, and medications were reviewed. There is no problem list on file for this patient. Social History     Socioeconomic History    Marital status:      Spouse name: Not on file    Number of children: Not on file    Years of education: Not on file    Highest education level: Not on file   Social Needs    Financial resource strain: Not on file    Food insecurity - worry: Not on file    Food insecurity - inability: Not on file    Transportation needs - medical: Not on file   [employer] needs - non-medical: Not on file   Occupational History    Not on file   Tobacco Use    Smoking status: Never Smoker    Smokeless tobacco: Never Used   Substance and Sexual Activity    Alcohol use: Yes     Comment: Social    Drug use: No    Sexual activity: Not on file   Other Topics Concern    Not on file   Social History Narrative    Not on file     Past Medical History:   Diagnosis Date    Arthritis     Basal cell carcinoma     Colonic polyp     High cholesterol     Nausea & vomiting          Prior to Admission Medications   Prescriptions Last Dose Informant Patient Reported? Taking?   aspirin 81 mg chewable tablet 12/16/2018 at Unknown time  Yes Yes   Sig: Take 81 mg by mouth daily. calcium carbonate (CALTREX) 600 mg calcium (1,500 mg) tablet 12/16/2018 at Unknown time  Yes Yes   Sig: Take 600 mg by mouth daily. glucosamine-chondroitin (ARTHX) 500-400 mg cap 12/16/2018 at Unknown time  Yes Yes   Sig: Take 1 Cap by mouth daily. rosuvastatin (CRESTOR) 10 mg tablet 12/16/2018  Yes Yes   Sig: Take 10 mg by mouth daily.       Facility-Administered Medications: None           The review of systems is:  Negative  for shortness of breath or chest pain      The heart, lungs, and mental status were satisfactory for the administration of deep sedation and for the procedure. I discussed with the patient the objectives, risks, consequences and alternatives to the procedure.       Masood Brown MD  12/17/2018  7:34 AM

## 2018-12-17 NOTE — DISCHARGE INSTRUCTIONS
[place] Office: [phone]    Tanya Isidro  [id number]  1950    EGD/COLONOSCOPY DISCHARGE INSTRUCTIONS  Discomfort:  Sore throat- throat lozenges or warm salt water gargle  redness at IV site- apply warm compress to area; if redness or soreness persist- contact your physician  Gaseous discomfort- walking, belching will help relieve any discomfort  You may not operate a vehicle for 24 hours  You may not engage in an occupation involving machinery or appliances for rest of today. You may not drink alcoholic beverages for at least 24 hours  Avoid making any critical decisions for at least 24 hour  DIET  You may resume your regular diet - however -  remember your colon is empty and a heavy meal will produce gas. Avoid these foods:  fried / greasy foods, excessive carbonated drinks or too much caffeine  MEDICATIONS   Regarding Aspirin or Nonsteroidal medications specifically, please see below. ACTIVITY  You may resume your normal daily activities. Spend the remainder of the day resting -  avoid any strenuous activity. CALL M.D. ANY SIGN OF   Increasing pain, nausea, vomiting  Abdominal distension (swelling)  New increased bleeding (oral or rectal)  Fever (chills)  Pain in chest area  Bloody discharge from nose or mouth  Shortness of breath    You may  take any Advil, Aspirin, Ibuprofen, Motrin, Aleve, or  Tylenol as needed for pain. Follow-up Instructions:   Call  Pipo Cullen MD for any questions or concerns   Telephone # [phone]      Follow-up Information    None              DO NOT TAKE SLEEPING MEDICATIONS OR ANTIANXIETY MEDICATIONS WHILE TAKING NARCOTIC PAIN MEDICATIONS,  ESPECIALLY THE NIGHT OF ANESTHESIA. CPAP PATIENTS BE SURE TO WEAR MACHINE WHENEVER NAPPING OR SLEEPING.     DISCHARGE SUMMARY from Nurse    The following personal items collected during your admission are returned to you:   Dental Appliance: Dental Appliances: None  Vision: Visual Aid: Glasses, With patient  Hearing Aid:    Jewelry:    Clothing:    Other Valuables:    Valuables sent to safe:        PATIENT INSTRUCTIONS:    After General Anesthesia or Intravenous Sedation, for 24 hours or while taking prescription Narcotics:        Someone should be with you for the next 24 hours. For your own safety, a responsible adult must drive you home. · Limit your activities  · Recommended activity: Rest today, up with assistance today. Do not climb stairs or shower unattended for the next 24 hours. · Please start with a soft bland diet and advance as tolerated (no nausea) to regular diet. · If you have a sore throat you should try the following: fluids, warm salt water gargles, or throat lozenges. If it does not improve after several days please follow up with your primary physician. · Do not drive and operate hazardous machinery  · Do not make important personal or business decisions  · Do  not drink alcoholic beverages  · If you have not urinated within 8 hours after discharge, please contact your surgeon on call. Report the following to your surgeon:  · Excessive pain, swelling, redness or odor of or around the surgical area  · Temperature over 100.5  · Nausea and vomiting lasting longer than 4 hours or if unable to take medications  · Any signs of decreased circulation or nerve impairment to extremity: change in color, persistent  numbness, tingling, coldness or increase pain      · You will receive a Post Operative Call from one of the Recovery Room Nurses on the day after your surgery to check on you. It is very important for us to know how you are recovering after your surgery. If you have an issue or need to speak with someone, please call your surgeon, do not wait for the post operative call. · You may receive an e-mail or letter in the mail from [facility] regarding your experience with us in the Ambulatory Surgery Unit.  Your feedback is valuable to us and we appreciate your participation in the survey. · If the above instructions are not adequate, please contact Vamsi Lincoln RN, [facility] anesthesia Nurse Manager or our Anesthesiologist, at [phone]. If you are having problems after your surgery, call the physician at his office number. · We wish you a speedy recovery ? What to do at Home:      *  Please give a list of your current medications to your Primary Care Provider. *  Please update this list whenever your medications are discontinued, doses are      changed, or new medications (including over-the-counter products) are added. *  Please carry medication information at all times in case of emergency situations. These are general instructions for a healthy lifestyle:    No smoking/ No tobacco products/ Avoid exposure to second hand smoke    Surgeon General's Warning:  Quitting smoking now greatly reduces serious risk to your health. Obesity, smoking, and sedentary lifestyle greatly increases your risk for illness    A healthy diet, regular physical exercise & weight monitoring are important for maintaining a healthy lifestyle    You may be retaining fluid if you have a history of heart failure or if you experience any of the following symptoms:  Weight gain of 3 pounds or more overnight or 5 pounds in a week, increased swelling in our hands or feet or shortness of breath while lying flat in bed. Please call your doctor as soon as you notice any of these symptoms; do not wait until your next office visit. Recognize signs and symptoms of STROKE:    B - Balance  E - Eyes    F-  Face looks uneven    A-  Arms unable to move or move even    S-  Speech slurred or non-existent    T-  Time-call 911 as soon as signs and symptoms begin-DO NOT go       Back to bed or wait to see if you get better-TIME IS BRAIN. If you have not received your influenza and/or pneumococcal vaccine, please follow up with your primary care physician.       The discharge information has been reviewed with the patient and spouse. The patient and spouse verbalized understanding.

## 2018-12-17 NOTE — PROCEDURES
Colonoscopy Procedure Note    [facility]  1950  [id number]    Indications:  Please see below. Pre-operative Diagnosis: PERSONAL HISTORY COLONIC POLYPS    Post-operative Diagnosis: DIVERTICULOSIS,internal hemorrhoids      : Pipo Pineda MD    Referring Provider: Isabel Pineda MD    Sedation:  MAC anesthesia Propofol        Procedure Details:    After detailed informed consent was obtained with all risks and benefits of procedure explained and preoperative exam completed, the patient was taken to the endoscopy suite and placed in the left lateral decubitus position. Upon sequential sedation as per above, a digital rectal exam was performed  And was normal.  The Olympus videocolonoscope  was inserted in the rectum and carefully advanced to the cecum, which was identified by the ileocecal valve and appendiceal orifice. The quality of preparation was good. The colonoscope was slowly withdrawn with careful evaluation between folds. Retroflexion in the rectum was performed. Findings:   · The sigmoid colon is mildly redundant (using a pediatric scope). · Mild sigmoid diverticulosis is noted. · Rest of the colon and its mucosa to the cecum is normal.  · Small internal hemorrhoids with hypertrophied anal papilla noted. Therapies:  none    Specimen:  none     Complications: None were encountered during the procedure. EBL:  None. Recommendations:     -Repeat colonoscopy in 5 years.  -High fiber diet.    -Naturally, for new bleeding, unexplained weight loss,change in bowel habits and anemia, an earlier colonoscopy should be considered. Pipo Pineda MD  12/17/2018  7:57 AM

## 2018-12-17 NOTE — PERIOP NOTES
Drake Mancini  1950  [id number]    Situation:  Verbal report given from:  LIOR Freire  Procedure: Procedure(s):  COLONOSCOPY    Background:    Preoperative diagnosis: PERSONAL HISTORY COLONIC POLYPS    Postoperative diagnosis: DIVERTICULOSIS, HEMORHOIDS    :  Dr. Dahlia Gusman    Assistant(s): Circ-1: Rajat Cifuentes RN  Circ-2: Angelo Drake RN  Scrub Tech-1: Blanche ORTA    Specimens: * No specimens in log *    Assessment:  Intra-procedure medications         Anesthesia gave intra-procedure sedation and medications, see anesthesia flow sheet     Intravenous fluids: LR@ KVO     Vital signs stable       Recommendation:    Permission to share finding with  : yes

## 2018-12-17 NOTE — PERIOP NOTES
Permission received to review discharge instructions and discuss private health information with Alexx yS ().

## 2018-12-17 NOTE — ANESTHESIA POSTPROCEDURE EVALUATION
Procedure(s):  COLONOSCOPY. Anesthesia Post Evaluation        Patient location during evaluation: PACU  Note status: Adequate. Level of consciousness: responsive to verbal stimuli and sleepy but conscious  Pain management: satisfactory to patient  Airway patency: patent  Anesthetic complications: no  Cardiovascular status: acceptable  Respiratory status: acceptable  Hydration status: acceptable  Comments: +Post-Anesthesia Evaluation and Assessment    Patient: Travis Méndez MRN: [id number]  SSN: [id number]   YOB: 1950  Age: 79 y.o. Sex: female      Cardiovascular Function/Vital Signs    /72   Pulse 66   Temp 36.5 °C (97.7 °F)   Resp 18   Ht 5' 6\" (1.676 m)   Wt 65.3 kg (144 lb)   SpO2 100%   Breastfeeding? No   BMI 23.24 kg/m²     Patient is status post Procedure(s):  COLONOSCOPY. Nausea/Vomiting: Controlled. Postoperative hydration reviewed and adequate. Pain:  Pain Scale 1: Numeric (0 - 10) (12/17/18 0816)  Pain Intensity 1: 0 (12/17/18 0816)   Managed. Neurological Status:   Neuro (WDL): Within Defined Limits (12/17/18 0816)   At baseline. Mental Status and Level of Consciousness: Arousable. Pulmonary Status:   O2 Device: Room air (12/17/18 0816)   Adequate oxygenation and airway patent. Complications related to anesthesia: None    Post-anesthesia assessment completed. No concerns. Signed By: Alysa Garcia MD    12/17/2018  Post anesthesia nausea and vomiting:  controlled      Visit Vitals  /72   Pulse 66   Temp 36.5 °C (97.7 °F)   Resp 18   Ht 5' 6\" (1.676 m)   Wt 65.3 kg (144 lb)   SpO2 100%   Breastfeeding?  No   BMI 23.24 kg/m²

## 2024-03-13 RX ORDER — ASPIRIN 81 MG/1
81 TABLET, CHEWABLE ORAL DAILY
COMMUNITY

## 2024-03-13 RX ORDER — ROSUVASTATIN CALCIUM 20 MG/1
20 TABLET, COATED ORAL DAILY
COMMUNITY

## 2024-03-14 ENCOUNTER — HOSPITAL ENCOUNTER (OUTPATIENT)
Facility: HOSPITAL | Age: 74
Setting detail: OUTPATIENT SURGERY
Discharge: HOME | End: 2024-03-14
Attending: INTERNAL MEDICINE | Admitting: INTERNAL MEDICINE
Payer: MEDICARE

## 2024-03-14 ENCOUNTER — ANESTHESIA EVENT (OUTPATIENT)
Facility: HOSPITAL | Age: 74
End: 2024-03-14
Payer: MEDICARE

## 2024-03-14 ENCOUNTER — ANESTHESIA (OUTPATIENT)
Facility: HOSPITAL | Age: 74
End: 2024-03-14
Payer: MEDICARE

## 2024-03-14 VITALS
RESPIRATION RATE: 17 BRPM | HEART RATE: 69 BPM | HEIGHT: 66 IN | BODY MASS INDEX: 23.95 KG/M2 | SYSTOLIC BLOOD PRESSURE: 147 MMHG | DIASTOLIC BLOOD PRESSURE: 72 MMHG | OXYGEN SATURATION: 100 % | WEIGHT: 149 LBS | TEMPERATURE: 97.7 F

## 2024-03-14 PROCEDURE — 2580000003 HC RX 258

## 2024-03-14 PROCEDURE — 7100000010 HC PHASE II RECOVERY - FIRST 15 MIN: Performed by: INTERNAL MEDICINE

## 2024-03-14 PROCEDURE — 6360000002 HC RX W HCPCS

## 2024-03-14 PROCEDURE — 3700000001 HC ADD 15 MINUTES (ANESTHESIA): Performed by: INTERNAL MEDICINE

## 2024-03-14 PROCEDURE — 3600007502: Performed by: INTERNAL MEDICINE

## 2024-03-14 PROCEDURE — 3700000000 HC ANESTHESIA ATTENDED CARE: Performed by: INTERNAL MEDICINE

## 2024-03-14 PROCEDURE — 2709999900 HC NON-CHARGEABLE SUPPLY: Performed by: INTERNAL MEDICINE

## 2024-03-14 PROCEDURE — 3600007512: Performed by: INTERNAL MEDICINE

## 2024-03-14 PROCEDURE — 2580000003 HC RX 258: Performed by: INTERNAL MEDICINE

## 2024-03-14 PROCEDURE — 2500000003 HC RX 250 WO HCPCS

## 2024-03-14 RX ORDER — SODIUM CHLORIDE 0.9 % (FLUSH) 0.9 %
5-40 SYRINGE (ML) INJECTION PRN
Status: DISCONTINUED | OUTPATIENT
Start: 2024-03-14 | End: 2024-03-14 | Stop reason: HOSPADM

## 2024-03-14 RX ORDER — SODIUM CHLORIDE 9 MG/ML
25 INJECTION, SOLUTION INTRAVENOUS PRN
Status: DISCONTINUED | OUTPATIENT
Start: 2024-03-14 | End: 2024-03-14 | Stop reason: HOSPADM

## 2024-03-14 RX ORDER — SODIUM CHLORIDE 9 MG/ML
INJECTION, SOLUTION INTRAVENOUS CONTINUOUS PRN
Status: DISCONTINUED | OUTPATIENT
Start: 2024-03-14 | End: 2024-03-14 | Stop reason: SDUPTHER

## 2024-03-14 RX ORDER — LABETALOL HYDROCHLORIDE 5 MG/ML
INJECTION, SOLUTION INTRAVENOUS PRN
Status: DISCONTINUED | OUTPATIENT
Start: 2024-03-14 | End: 2024-03-14 | Stop reason: SDUPTHER

## 2024-03-14 RX ORDER — SODIUM CHLORIDE 0.9 % (FLUSH) 0.9 %
5-40 SYRINGE (ML) INJECTION EVERY 12 HOURS SCHEDULED
Status: DISCONTINUED | OUTPATIENT
Start: 2024-03-14 | End: 2024-03-14 | Stop reason: HOSPADM

## 2024-03-14 RX ORDER — LIDOCAINE HYDROCHLORIDE 20 MG/ML
INJECTION, SOLUTION EPIDURAL; INFILTRATION; INTRACAUDAL; PERINEURAL PRN
Status: DISCONTINUED | OUTPATIENT
Start: 2024-03-14 | End: 2024-03-14 | Stop reason: SDUPTHER

## 2024-03-14 RX ADMIN — PROPOFOL 50 MG: 10 INJECTION, EMULSION INTRAVENOUS at 14:32

## 2024-03-14 RX ADMIN — PROPOFOL 40 MG: 10 INJECTION, EMULSION INTRAVENOUS at 14:44

## 2024-03-14 RX ADMIN — LIDOCAINE HYDROCHLORIDE 100 MG: 20 INJECTION, SOLUTION EPIDURAL; INFILTRATION; INTRACAUDAL; PERINEURAL at 14:31

## 2024-03-14 RX ADMIN — PROPOFOL 20 MG: 10 INJECTION, EMULSION INTRAVENOUS at 14:37

## 2024-03-14 RX ADMIN — PROPOFOL 40 MG: 10 INJECTION, EMULSION INTRAVENOUS at 14:34

## 2024-03-14 RX ADMIN — LABETALOL HYDROCHLORIDE 5 MG: 5 INJECTION, SOLUTION INTRAVENOUS at 14:40

## 2024-03-14 RX ADMIN — PROPOFOL 40 MG: 10 INJECTION, EMULSION INTRAVENOUS at 14:36

## 2024-03-14 RX ADMIN — PROPOFOL 20 MG: 10 INJECTION, EMULSION INTRAVENOUS at 14:41

## 2024-03-14 RX ADMIN — PROPOFOL 100 MG: 10 INJECTION, EMULSION INTRAVENOUS at 14:31

## 2024-03-14 RX ADMIN — SODIUM CHLORIDE 25 ML: 9 INJECTION, SOLUTION INTRAVENOUS at 14:10

## 2024-03-14 RX ADMIN — PROPOFOL 40 MG: 10 INJECTION, EMULSION INTRAVENOUS at 14:40

## 2024-03-14 RX ADMIN — SODIUM CHLORIDE: 9 INJECTION, SOLUTION INTRAVENOUS at 14:29

## 2024-03-14 RX ADMIN — PROPOFOL 40 MG: 10 INJECTION, EMULSION INTRAVENOUS at 14:46

## 2024-03-14 RX ADMIN — PROPOFOL 40 MG: 10 INJECTION, EMULSION INTRAVENOUS at 14:42

## 2024-03-14 RX ADMIN — PROPOFOL 20 MG: 10 INJECTION, EMULSION INTRAVENOUS at 14:43

## 2024-03-14 RX ADMIN — PROPOFOL 20 MG: 10 INJECTION, EMULSION INTRAVENOUS at 14:39

## 2024-03-14 RX ADMIN — LABETALOL HYDROCHLORIDE 5 MG: 5 INJECTION, SOLUTION INTRAVENOUS at 14:36

## 2024-03-14 RX ADMIN — PROPOFOL 40 MG: 10 INJECTION, EMULSION INTRAVENOUS at 14:38

## 2024-03-14 RX ADMIN — PROPOFOL 40 MG: 10 INJECTION, EMULSION INTRAVENOUS at 14:48

## 2024-03-14 RX ADMIN — PROPOFOL 10 MG: 10 INJECTION, EMULSION INTRAVENOUS at 14:47

## 2024-03-14 ASSESSMENT — PAIN - FUNCTIONAL ASSESSMENT: PAIN_FUNCTIONAL_ASSESSMENT: 0-10

## 2024-03-14 NOTE — PROCEDURES
Endoscopy Case End Note:    1454:  Procedure scope was pre-cleaned, per protocol, at bedside by DELMY Rome.      1458:  Report received from anesthesia - VIOLA Ortiz CRNA.  See anesthesia flowsheet for intra-procedure vital signs and events.

## 2024-03-14 NOTE — ANESTHESIA PRE PROCEDURE
Department of Anesthesiology  Preprocedure Note       Name:  Carine Jones   Age:  73 y.o.  :  1950                                          MRN:  [id number]         Date:  3/14/2024      Surgeon: Surgeon(s):  Aleksandr Bhatt MD    Procedure: Procedure(s):  COLONOSCOPY DIAGNOSTIC    Medications prior to admission:   Prior to Admission medications    Medication Sig Start Date End Date Taking? Authorizing Provider   rosuvastatin (CRESTOR) 20 MG tablet Take 1 tablet by mouth daily   Yes Prisca Ball MD   Calcium Carbonate (CALTRATE 600 PO) Take by mouth   Yes Prisca Ball MD   GLUCOSAMINE-CHONDROITIN PO Take by mouth   Yes Prisca Ball MD   aspirin 81 MG chewable tablet Take 1 tablet by mouth daily   Yes Prisca Ball MD       Current medications:    Current Facility-Administered Medications   Medication Dose Route Frequency Provider Last Rate Last Admin   • sodium chloride flush 0.9 % injection 5-40 mL  5-40 mL IntraVENous 2 times per day Aleksandr Bhatt MD       • sodium chloride flush 0.9 % injection 5-40 mL  5-40 mL IntraVENous PRN Aleksandr Bhatt MD       • 0.9 % sodium chloride infusion  25 mL IntraVENous PRN Aleksandr Bhatt MD           Allergies:  No Known Allergies    Problem List:  There is no problem list on file for this patient.      Past Medical History:        Diagnosis Date   • Hyperlipidemia        Past Surgical History:        Procedure Laterality Date   • COLONOSCOPY     • KNEE ARTHROSCOPY Bilateral     torn meniscus repairs   • TONSILLECTOMY         Social History:    Social History     Tobacco Use   • Smoking status: Never   • Smokeless tobacco: Never   Substance Use Topics   • Alcohol use: Yes     Alcohol/week: 2.0 standard drinks of alcohol     Types: 2 Drinks containing 0.5 oz of alcohol per week                                Counseling given: Not Answered      Vital Signs (Current): There were no vitals filed for

## 2024-03-14 NOTE — DISCHARGE INSTRUCTIONS
[place] Office: [phone]    Carine Jones  [id number]  1950    EGD/COLONOSCOPY DISCHARGE INSTRUCTIONS  Discomfort:  Sore throat- throat lozenges or warm salt water gargle  redness at IV site- apply warm compress to area; if redness or soreness persist- contact your physician  Gaseous discomfort- walking, belching will help relieve any discomfort  You may not operate a vehicle for 12 hours  You may not engage in an occupation involving machinery or appliances for rest of today.  You may not drink alcoholic beverages for at least 12 hours  Avoid making any critical decisions for at least 24 hour  DIET  You may resume your regular diet - however -  remember your colon is empty and a heavy meal will produce gas.   Avoid these foods:  fried / greasy foods, excessive carbonated drinks or too much caffeine  MEDICATIONS   Regarding Aspirin or Nonsteroidal medications specifically, please see below.  ACTIVITY  You may resume your normal daily activities.   Spend the remainder of the day resting -  avoid any strenuous activity.    CALL M.D.  ANY SIGN OF   Increasing pain, nausea, vomiting  Abdominal distension (swelling)  New increased bleeding (oral or rectal)  Fever (chills)  Pain in chest area  Bloody discharge from nose or mouth  Shortness of breath    You may  take any Advil, Aspirin, Ibuprofen, Motrin or Tylenol as needed for pain.    Findings:  The Olympus video high-definition colonoscope was advanced to the cecum, identified by its typical land marks, with some difficultly as the sigmoid colon is redundant.  Moderate sigmoid diverticulosis noted.  Ti is normal appearing.  The mucosa of the colon is normal appearing to the cecum with no obvious mucosal pathology (polyp, mass lesion, colitis etc) noted on this colonoscopy.  Small internal hemorrhoids seen w/ external skin tags.      Follow-up Instructions:   Call  Pranav Bhatt MD for any questions or concerns     Telephone #

## 2024-03-14 NOTE — ANESTHESIA POSTPROCEDURE EVALUATION
Department of Anesthesiology  Postprocedure Note    Patient: Carine Jones  MRN: [id number]  YOB: 1950  Date of evaluation: 3/14/2024    Procedure Summary       Date: 03/14/24 Room / Location: [facility] ENDO 01 / [facility] ENDOSCOPY    Anesthesia Start: 1427 Anesthesia Stop: 1500    Procedure: COLONOSCOPY DIAGNOSTIC (Lower GI Region) Diagnosis:       Personal history of colonic polyps      (Personal history of colonic polyps [Z86.010])    Surgeons: Aleksandr Bhatt MD Responsible Provider: Thee Adams MD    Anesthesia Type: MAC ASA Status: 2            Anesthesia Type: MAC    Vikash Phase I: Vikash Score: 10    Vikash Phase II:      Anesthesia Post Evaluation    Patient location during evaluation: PACU  Patient participation: complete - patient participated  Level of consciousness: sleepy but conscious and responsive to verbal stimuli  Airway patency: patent  Nausea & Vomiting: no vomiting and no nausea  Cardiovascular status: blood pressure returned to baseline and hemodynamically stable  Respiratory status: acceptable  Hydration status: stable    No notable events documented.

## 2024-03-14 NOTE — PROGRESS NOTES
TRANSFER - IN REPORT:    Verbal report received from Monica Jones  being received from josesito   for routine progression of patient care      Report consisted of patient's Situation, Background, Assessment and   Recommendations(SBAR).     Information from the following report(s) Nurse Handoff Report was reviewed with the receiving nurse.    Opportunity for questions and clarification was provided.      Assessment completed upon patient's arrival to unit and care assumed.

## 2024-03-14 NOTE — OP NOTE
Colonoscopy Procedure Note    Carine Jones  1950  [id number]    Indications:  Please see below.    Pre-operative Diagnosis:   Personal history of colonic polyps [Z86.010] ,   Family hx of colon cancer    Post-operative Diagnosis:   Diverticulosis,  Internal hemorrhoids,  Redundant colon    : Pranav Bhatt MD    Referring Provider: Thee Burgos MD    Sedation:  MAC anesthesia Propofol        Procedure Details:    After detailed informed consent was obtained with all risks and benefits of procedure explained and preoperative exam completed, the patient was taken to the endoscopy suite and placed in the left lateral decubitus position.  Upon sequential sedation as per above, a digital rectal exam was performed  and was normal.  The Olympus videocolonoscope  was inserted in the rectum and carefully advanced to the cecum, which was identified by the ileocecal valve and appendiceal orifice.   The colonoscope was slowly withdrawn with careful evaluation between folds.   Retroflexion in the rectum was performed.      The quality of preparation was good    [place] Bowel Preparation Score: 3/3/3    0 = Unprepared colon segment with mucosa not seen due to solid stool that cannot be cleared.  1 = Portion of mucosa of the colon segment seen, but other areas of the colon segment not well seen due to staining, residual stool and/or opaque liquid.  2 = Minor amount of residual staining, small fragments of stool and/or opaque liquid, but mucosa of colon segment seen well.  3 = Entire mucosa of colon segment seen well with no residual staining, small fragments of stool or opaque liquid.    Findings:   The Olympus video high-definition colonoscope was advanced to the cecum, identified by its typical land marks, with some difficultly as the sigmoid colon is redundant.  Moderate sigmoid diverticulosis noted.  Ti is normal appearing.  The mucosa of the colon is normal appearing to the cecum with no obvious mucosal  pathology (polyp, mass lesion, colitis etc) noted on this colonoscopy.  Small internal hemorrhoids seen w/ external skin tags.      Therapies:  none    Specimen/s:  none   Complications: None were encountered during the procedure.     EBL:  None.    Recommendations:     -Repeat colonoscopy in 5 years.    Naturally, for new bleeding, unexplained weight loss,change in bowel habits and anemia, an earlier colonoscopy should be considered.      GUILLERMINA Bhatt MD  3/14/2024  2:53 PM

## 2024-03-14 NOTE — H&P
Pre-endoscopy H and P    The patient was seen and examined in the room/pre-op holding area.  The airway was assessed and documented.  The problem list, past medical history, and medications were reviewed.     There is no problem list on file for this patient.    Social History     Socioeconomic History    Marital status:      Spouse name: Not on file    Number of children: Not on file    Years of education: Not on file    Highest education level: Not on file   Occupational History    Not on file   Tobacco Use    Smoking status: Never    Smokeless tobacco: Never   Vaping Use    Vaping Use: Never used   Substance and Sexual Activity    Alcohol use: Yes     Alcohol/week: 2.0 standard drinks of alcohol     Types: 2 Drinks containing 0.5 oz of alcohol per week    Drug use: Not Currently    Sexual activity: Not on file   Other Topics Concern    Not on file   Social History Narrative    Not on file     Social Determinants of Health     Financial Resource Strain: Not on file   Food Insecurity: Not on file   Transportation Needs: Not on file   Physical Activity: Not on file   Stress: Not on file   Social Connections: Not on file   Intimate Partner Violence: Not on file   Housing Stability: Not on file     Past Medical History:   Diagnosis Date    Hyperlipidemia          Prior to Admission Medications   Prescriptions Last Dose Informant Patient Reported? Taking?   Calcium Carbonate (CALTRATE 600 PO) 3/13/2024  Yes Yes   Sig: Take by mouth   GLUCOSAMINE-CHONDROITIN PO 3/13/2024  Yes Yes   Sig: Take by mouth   aspirin 81 MG chewable tablet 3/13/2024  Yes Yes   Sig: Take 1 tablet by mouth daily   rosuvastatin (CRESTOR) 20 MG tablet 3/13/2024  Yes Yes   Sig: Take 1 tablet by mouth daily      Facility-Administered Medications: None           The review of systems is:  Negative  for shortness of breath or chest pain      The heart, lungs, and mental status were satisfactory for the administration of deep sedation and for the  procedure.      I discussed with the patient the objectives, risks, consequences and alternatives to the procedure.      Aleksandr Bhatt MD  3/14/2024  2:24 PM   no pain, swelling or deformity of joints

## 2024-03-14 NOTE — PROGRESS NOTES
ARRIVAL INFORMATION:  Verified patient name and date of birth, scheduled procedure, and informed consent.     : [place] () contact number: [phone]  Physician and staff can share information with the .     Belongings with patient include:  Clothing,None    GI FOCUSED ASSESSMENT:  Neuro: Awake, alert, oriented x4  Respiratory: even and unlabored   GI: soft and non-distended  EKG Rhythm: normal sinus rhythm    Education:Reviewed general discharge instructions and  information.

## 2024-07-24 ENCOUNTER — TELEPHONE (OUTPATIENT)
Facility: HOSPITAL | Age: 74
End: 2024-07-24

## 2024-07-24 DIAGNOSIS — C50.911 MALIGNANT NEOPLASM OF RIGHT FEMALE BREAST, UNSPECIFIED ESTROGEN RECEPTOR STATUS, UNSPECIFIED SITE OF BREAST (HCC): Primary | ICD-10-CM

## 2024-07-24 NOTE — TELEPHONE ENCOUNTER
Reached out to patient for initial navigator contact. Informed her, the appointment has been moved from 8/21 to 8/7 at 11am at [facility] office.  I explained what happens at the first consultation - an exam by the physician, a possible ultrasound, then complete discussion of surgical options and other treatment that may be considered.  Her  will attend the appointment with her.        Discussed that a breast MRI has been ordered by Dr. Langford,  and is the next step in her work-up.  I explained the MRI procedure to her.  I confirmed that she is not claustrophobic, does not have breast implants and does not have any metal in her body.  I explained that she can eat and drink normally and can drive herself to and from the appointment.  I told her that she would be asked to remove most metal items.  I will have to call her back to schedule as she is out of town next week.                           .    I provided the patient with my name and phone number.  Discussed the role of navigation.  She verbalized understanding and agreement with the plan. FIDENCIO Johns

## 2024-07-24 NOTE — PROGRESS NOTES
Order placed for BILATERAL breast MRI with and without contrast per verbal order of Dr. Basilia Langford.

## 2024-08-05 ENCOUNTER — HOSPITAL ENCOUNTER (OUTPATIENT)
Age: 74
Discharge: HOME | End: 2024-08-08
Payer: MEDICARE

## 2024-08-05 DIAGNOSIS — C50.911 MALIGNANT NEOPLASM OF RIGHT FEMALE BREAST, UNSPECIFIED ESTROGEN RECEPTOR STATUS, UNSPECIFIED SITE OF BREAST (HCC): ICD-10-CM

## 2024-08-05 PROCEDURE — C8908 MRI W/O FOL W/CONT, BREAST,: HCPCS

## 2024-08-05 PROCEDURE — A9585 GADOBUTROL INJECTION: HCPCS | Performed by: RADIOLOGY

## 2024-08-05 PROCEDURE — 6360000004 HC RX CONTRAST MEDICATION: Performed by: RADIOLOGY

## 2024-08-05 RX ORDER — GADOBUTROL 604.72 MG/ML
7 INJECTION INTRAVENOUS
Status: COMPLETED | OUTPATIENT
Start: 2024-08-05 | End: 2024-08-05

## 2024-08-05 RX ADMIN — GADOBUTROL 7 ML: 604.72 INJECTION INTRAVENOUS at 10:04

## 2024-08-06 NOTE — H&P (VIEW-ONLY)
HISTORY OF PRESENT ILLNESS  Carine Jones is a 73 y.o. female     HPI NEW patient consult referred by VPW for RIGHT breast IDC. Denies any breast changes, it was found on imaging.     07/17/2024 -  (A) RIGHT BREAST 8:00 IDC grade 1, ER positive, NH positive, HER2 EQ FISH negative. (B) RIGHT breast 9:00 Benign.      Family History:    Father- colon cancer.     Mammogram, 07/11/2024, BIRADS 4    MRI Result (most recent):  MRI BREAST BILATERAL W WO CONTRAST 08/05/2024    Narrative  HISTORY: Newly diagnosed invasive ductal carcinoma in the right breast at 8:00..  An asymmetry was also biopsied at 9:00, but yielded fibrocystic change.    EXAM: BILATERAL BREAST MRI WITH AND WITHOUT CONTRAST.    CONTRAST: 7.0 mL Gadavist.    ANESTHESIA: None    PROCEDURE: Bilateral high resolution dynamic and morphologic enhanced breast MRI  was performed using a dedicated breast coil in the prone position. Pre- and  postcontrast axial and sagittal T1 and STIR images were performed. These were  post-processed using CAD kinetic analysis, digital subtraction, enhancement  curves and 2D and 3D reconstructions.    COMPARISON: Most recent mammography and ultrasonography 7/17/2024 and 7/11/2024.    FINDINGS: Background parenchyma is mild to moderate. Background parenchymal  enhancement is moderate.    Right breast: There are multiple biopsy clip artifact throughout the right  breast, including 1 at 9:00 without abnormal enhancement and one at 8:00 with a  dynamically enhancing mass showing washout kinetics. It measures 1.1 x 0.9 x 1.2  cm and corresponds in location to the recently diagnosed malignancy. There are  in numerable other prominent enhancing foci throughout the right breast but no  additional dominant masses or abnormalities of contrast enhancement. No internal  mammary or axillary lymphadenopathy.    Left breast: Innumerable foci showing dynamic enhancement, but no dominant  abnormality of morphology or enhancement stands out

## 2024-08-06 NOTE — PROGRESS NOTES
HISTORY OF PRESENT ILLNESS  Carine Jones is a 73 y.o. female     HPI NEW patient consult referred by VPW for RIGHT breast IDC. Denies any breast changes, it was found on imaging.     07/17/2024 -  (A) RIGHT BREAST 8:00 IDC grade 1, ER positive, TN positive, HER2 EQ FISH negative. (B) RIGHT breast 9:00 Benign.      Family History:    Father- colon cancer.     Mammogram, 07/11/2024, BIRADS 4    MRI Result (most recent):  MRI BREAST BILATERAL W WO CONTRAST 08/05/2024    Narrative  HISTORY: Newly diagnosed invasive ductal carcinoma in the right breast at 8:00..  An asymmetry was also biopsied at 9:00, but yielded fibrocystic change.    EXAM: BILATERAL BREAST MRI WITH AND WITHOUT CONTRAST.    CONTRAST: 7.0 mL Gadavist.    ANESTHESIA: None    PROCEDURE: Bilateral high resolution dynamic and morphologic enhanced breast MRI  was performed using a dedicated breast coil in the prone position. Pre- and  postcontrast axial and sagittal T1 and STIR images were performed. These were  post-processed using CAD kinetic analysis, digital subtraction, enhancement  curves and 2D and 3D reconstructions.    COMPARISON: Most recent mammography and ultrasonography 7/17/2024 and 7/11/2024.    FINDINGS: Background parenchyma is mild to moderate. Background parenchymal  enhancement is moderate.    Right breast: There are multiple biopsy clip artifact throughout the right  breast, including 1 at 9:00 without abnormal enhancement and one at 8:00 with a  dynamically enhancing mass showing washout kinetics. It measures 1.1 x 0.9 x 1.2  cm and corresponds in location to the recently diagnosed malignancy. There are  in numerable other prominent enhancing foci throughout the right breast but no  additional dominant masses or abnormalities of contrast enhancement. No internal  mammary or axillary lymphadenopathy.    Left breast: Innumerable foci showing dynamic enhancement, but no dominant  abnormality of morphology or enhancement stands out

## 2024-08-07 ENCOUNTER — OFFICE VISIT (OUTPATIENT)
Age: 74
End: 2024-08-07
Payer: MEDICARE

## 2024-08-07 ENCOUNTER — TELEPHONE (OUTPATIENT)
Age: 74
End: 2024-08-07

## 2024-08-07 ENCOUNTER — SOCIAL WORK (OUTPATIENT)
Age: 74
End: 2024-08-07

## 2024-08-07 ENCOUNTER — CLINICAL DOCUMENTATION (OUTPATIENT)
Age: 74
End: 2024-08-07

## 2024-08-07 ENCOUNTER — PREP FOR PROCEDURE (OUTPATIENT)
Age: 74
End: 2024-08-07

## 2024-08-07 VITALS — HEIGHT: 60 IN | WEIGHT: 150 LBS | BODY MASS INDEX: 29.45 KG/M2

## 2024-08-07 DIAGNOSIS — C50.911 MALIGNANT NEOPLASM OF RIGHT BREAST IN FEMALE, ESTROGEN RECEPTOR POSITIVE, UNSPECIFIED SITE OF BREAST (HCC): Primary | ICD-10-CM

## 2024-08-07 DIAGNOSIS — Z17.0 MALIGNANT NEOPLASM OF RIGHT BREAST IN FEMALE, ESTROGEN RECEPTOR POSITIVE, UNSPECIFIED SITE OF BREAST (HCC): Primary | ICD-10-CM

## 2024-08-07 DIAGNOSIS — C50.911 MALIGNANT NEOPLASM OF RIGHT FEMALE BREAST, UNSPECIFIED ESTROGEN RECEPTOR STATUS, UNSPECIFIED SITE OF BREAST (HCC): Primary | ICD-10-CM

## 2024-08-07 PROCEDURE — 99417 PROLNG OP E/M EACH 15 MIN: CPT | Performed by: SURGERY

## 2024-08-07 PROCEDURE — 99205 OFFICE O/P NEW HI 60 MIN: CPT | Performed by: SURGERY

## 2024-08-07 PROCEDURE — 76642 ULTRASOUND BREAST LIMITED: CPT | Performed by: SURGERY

## 2024-08-07 PROCEDURE — G8427 DOCREV CUR MEDS BY ELIG CLIN: HCPCS | Performed by: SURGERY

## 2024-08-07 PROCEDURE — 93702 BIS XTRACELL FLUID ANALYSIS: CPT | Performed by: SURGERY

## 2024-08-07 PROCEDURE — G8420 CALC BMI NORM PARAMETERS: HCPCS | Performed by: SURGERY

## 2024-08-07 PROCEDURE — 1090F PRES/ABSN URINE INCON ASSESS: CPT | Performed by: SURGERY

## 2024-08-07 RX ORDER — CELECOXIB 100 MG/1
100 CAPSULE ORAL DAILY
COMMUNITY
Start: 2024-03-19

## 2024-08-07 NOTE — PROGRESS NOTES
NCCN Distress Thermometer    Date Screening Completed: 8/7/24    Screening Declined:  [] Yes    Number that best describes how much distress you've experienced in the past week, including today?  0 [] - No distress 1 []      2 []      3 []      4 []       5 [x]       6 []      7 []      8 []      9 []       10 [] - Extreme distress    PROBLEM LIST  Have you had concerns about any of the items below in the past week, including today?      Physical Concerns Practical Concerns   [] Pain [] Taking care of myself    [x] Sleep [] Taking care of others    [] Fatigue [] Work   [] Tobacco use  [] School   [] Substance use  [] Housing   [] Memory or concentration [] Finances   [] Sexual health [] Insurance   [] Changes in eating  [] Transportation   [] Loss or change of physical abilities  []     [] Having enough food   Emotional Concerns [] Access to medicine   [x] Worry or anxiety [] Treatment decisions   [] Sadness or depression    [] Loss of interest or enjoyment  Spiritual or Zoroastrian Concerns   [] Grief or loss  [] Sense of meaning or purpose   [] Fear [] Changes in tanesha or beliefs   [] Loneliness  [] Death, dying, or afterlife   [] Anger [] Conflict between beliefs and cancer treatments    [] Changes in appearance [] Relationship with the sacred   [] Feelings of worthlessness or being a burden [] Ritual or dietary needs        Social Concerns     [] Relationship with spouse or partner     [] Relationship with children    [] Relationship with family members     [] Relationship with friends or coworkers     [] Communication with health care team     [] Ability to have children     [] Prejudice or discrimination        Other Concerns:     Patient received resource information and education:  [x] Yes  [] No

## 2024-08-07 NOTE — PROGRESS NOTES
Aramis Milton  Oncology Social Work Encounter    [] Med-Onc [facility] [] Med-Onc [facility] [] Med-Onc [facility] [] Rad-Onc [facility] [] Rad-Onc [facility] [] Rad-Onc [facility] [] Rad-Onc [facility] [x] Breast Center    Patient: Carine Jones    Encounter Type:    [x] Initial SW Encounter  [] Patient Initiated  [] Referral  [x] Distress/PHQ Screening  [] Other:      Concern(s)/Barrier(s) to Care: N/A    Narrative:   Met with the patient and her spouse at the time of her breast talk appointment this morning. Introduced self and role on the care team.     Pt reports doing okay following her discussion with her surgeon, utilizing an \"It is what it is\" mindset. Pt and her spouse shared that her spouse went through treatment for prostate cancer in 2020, so this is somewhat familiar territory for them. Pt also shared she is scheduled to have a knee replacement in October.     Pt is comfortable with the plan for a lumpectomy. She seems to be coping well overall, she and her spouse sharing that they did not have immediate concerns at this time. Encouraged them to reach out to SW as desired.     Referral/Handouts:           Plan:   Provide ongoing psychosocial support as desired by the patient.   SW remains available for further support and assistance.

## 2024-08-09 ENCOUNTER — CLINICAL DOCUMENTATION (OUTPATIENT)
Age: 74
End: 2024-08-09

## 2024-08-09 DIAGNOSIS — C50.911 MALIGNANT NEOPLASM OF RIGHT BREAST IN FEMALE, ESTROGEN RECEPTOR POSITIVE, UNSPECIFIED SITE OF BREAST (HCC): Primary | ICD-10-CM

## 2024-08-09 DIAGNOSIS — Z17.0 MALIGNANT NEOPLASM OF RIGHT BREAST IN FEMALE, ESTROGEN RECEPTOR POSITIVE, UNSPECIFIED SITE OF BREAST (HCC): Primary | ICD-10-CM

## 2024-08-09 NOTE — PROGRESS NOTES
Patient Surgery Information Sheet      Patient Name:  Carine Jones  Surgery Date:  August 29, 2024    Type of Surgery: RIGHT BREAST ULTRASOUND GUIDED LUMPECTOMY RIGHT BREAST SENTINEL NODE BIOPSY    Estimated arrival time 7:00 AM    Arrival time will be confirmed the afternoon before your surgery.    Pre-procedure: Blue dye injection  on 8/29/2024 at 7:30 am (arrive at 7:00 am)    Pre-Operative Testing Department will call to schedule pre-op testing appointment if needed before surgery    Hospital:  [facility]   Address:  [address] [place] [id number]  Check in location:  [facility], the second surgery center past the Emergency Room    Pre-Operative Instructions:  Will be given at the pre-op appointment.    Special Instructions if needed:     NPO (nothing by mouth) or drinking after midnight the night before Surgery  Patient may shower the morning of, do not use any lotion, deodorant, powders, perfumes or makeup  Patient will need  the morning of surgery     Post operative visit: 9/18/2024 at 10:45 am with Dr. Langford    Surgery Scheduler:   Opal Preciado

## 2024-08-16 NOTE — PERIOP NOTE
Hibiclens/Chlorhexidine    Preventing Infections Before and After - Your Surgery    IMPORTANT INSTRUCTIONS    Please read and follow these instructions carefully. If you are unable to comply with the below instructions your procedure will be cancelled.       Every Night for Three (3) nights before your surgery:  Shower with an antibacterial soap, such as Dial, or the soap provided at your preassessment appointment. A shower is better than a bath for cleaning your skin.  If needed, ask someone to help you reach all areas of your body. Don’t forget to clean your belly button with every shower.    The night before your surgery:   If you lose your Hibiclens/chlorhexidine please contact surgery center or you can purchase it at a local pharmacy  On the night before your surgery, shower with an antibacterial soap, such as Dial, or the soap provided at your preassessment appointment.   With bottle of Hibiclens/Chlorhexidine in hand, turn water off.  Apply Hibiclens antiseptic skin cleanser with a clean, freshly washed washcloth.  Gently apply to your body from chin to toes (except the genital area) and especially the area(s) where your incision(s) will be.  Leave Hibiclens/Chlorhexidine on your skin for at least 20 seconds.    CAUTION: If needed, Hibiclens/chlorhexidine may be used to clean the folds of skin of the legs (such as in the area of the groin) and on your buttocks and hips. However, do not use Hibiclens/Chlorhexidine above the neck or in the genital area (your bottom) or put inside any area of your body.  Turn the water back on and rinse.  Dry gently with a clean, freshly washed towel.  After your shower, do not use any powder, deodorant, perfumes or lotion.  Use clean, freshly washed towels and washcloths every time you shower.  Wear clean, freshly washed pajamas to bed the night before surgery.  Sleep on clean, freshly washed sheets.  Do not allow pets to sleep in your bed with you.        The Morning of your  surgery:  Shower again thoroughly with an antibacterial soap, such as Dial or the soap provided at your preassessment appointment. If needed, ask someone for help to reach all areas of your body. Don’t forget to clean your belly button! Rinse.  With bottle of Hibiclens/Chlorhexidine in hand, turn water off.  Apply Hibiclens antiseptic skin cleanser with a clean, freshly washed washcloth.  Gently apply to your body from chin to toes (except the genital area) and especially the area(s) where your incision(s) will be.  Leave Hibiclens/Chlorhexidine on your skin for at least 20 seconds.     CAUTION: If needed, Hibiclens/chlorhexidine may be used to clean the folds of skin of the legs (such as in the area of the groin) and on your buttocks and hips. However, do not use Hibiclens/Chlorhexidine above the neck or in the genital area (your bottom) or put inside any area of your body.  Turn the water back on and rinse.  Dry gently with a clean, freshly washed towel.  After your shower, do not use any powder, deodorant, perfumes or lotion prior to surgery.  Put on clean, freshly washed clothing.    Tips to help prevent infections after your surgery:  Protect your surgical wound from germs:  Hand washing is the most important thing you and your caregivers can do to prevent infections.  Keep your bandage clean and dry!  Do not touch your surgical wound.  Use clean, freshly washed towels and washcloths every time you shower; do not share bath linens with others.  Until your surgical wound is healed, wear clothing and sleep on bed linens each day that are clean and freshly washed.  Do not allow pets to sleep in your bed with you or touch your surgical wound.  Do not smoke - smoking delays wound healing. This may be a good time to stop smoking.  If you have diabetes, it is important for you to manage your blood sugar levels properly before your surgery as well as after your surgery. Poorly managed blood sugar levels slow down wound  healing and prevent you from healing completely.    If you lose your Hibiclens/chlorhexidine, please call the Surgery Center, or it is available for purchase at your pharmacy.               ___________________      ___________________      ________________  (Signature of Patient)          (Witness)                   (Date and Time)

## 2024-08-16 NOTE — PERIOP NOTE
patient, able to verbalized understanding.         ___________________      ___________________      ________________  (Signature of Patient)          (Witness)                   (Date and Time)

## 2024-08-20 ENCOUNTER — TELEPHONE (OUTPATIENT)
Age: 74
End: 2024-08-20

## 2024-08-20 NOTE — TELEPHONE ENCOUNTER
Called patient to make sure she knew to stop taking her celebrex and aspirin 7 days before surgery.

## 2024-08-28 ENCOUNTER — ANESTHESIA EVENT (OUTPATIENT)
Facility: HOSPITAL | Age: 74
End: 2024-08-28
Payer: MEDICARE

## 2024-08-29 ENCOUNTER — APPOINTMENT (OUTPATIENT)
Facility: HOSPITAL | Age: 74
End: 2024-08-29
Attending: SURGERY
Payer: MEDICARE

## 2024-08-29 ENCOUNTER — ANESTHESIA (OUTPATIENT)
Facility: HOSPITAL | Age: 74
End: 2024-08-29
Payer: MEDICARE

## 2024-08-29 ENCOUNTER — HOSPITAL ENCOUNTER (OUTPATIENT)
Facility: HOSPITAL | Age: 74
Setting detail: OUTPATIENT SURGERY
Discharge: HOME | End: 2024-08-29
Attending: SURGERY | Admitting: SURGERY
Payer: MEDICARE

## 2024-08-29 VITALS
RESPIRATION RATE: 20 BRPM | HEART RATE: 77 BPM | HEIGHT: 66 IN | DIASTOLIC BLOOD PRESSURE: 64 MMHG | SYSTOLIC BLOOD PRESSURE: 130 MMHG | TEMPERATURE: 97.8 F | OXYGEN SATURATION: 100 % | BODY MASS INDEX: 23.63 KG/M2 | WEIGHT: 147 LBS

## 2024-08-29 DIAGNOSIS — C50.911 MALIGNANT NEOPLASM OF RIGHT BREAST IN FEMALE, ESTROGEN RECEPTOR POSITIVE, UNSPECIFIED SITE OF BREAST (HCC): ICD-10-CM

## 2024-08-29 DIAGNOSIS — Z17.0 MALIGNANT NEOPLASM OF RIGHT BREAST IN FEMALE, ESTROGEN RECEPTOR POSITIVE, UNSPECIFIED SITE OF BREAST (HCC): ICD-10-CM

## 2024-08-29 DIAGNOSIS — G89.18 PAIN FOLLOWING SURGERY OR PROCEDURE: Primary | ICD-10-CM

## 2024-08-29 PROCEDURE — 2580000003 HC RX 258: Performed by: SURGERY

## 2024-08-29 PROCEDURE — 2580000003 HC RX 258: Performed by: ANESTHESIOLOGY

## 2024-08-29 PROCEDURE — 7100000001 HC PACU RECOVERY - ADDTL 15 MIN: Performed by: SURGERY

## 2024-08-29 PROCEDURE — 3430000000 HC RX DIAGNOSTIC RADIOPHARMACEUTICAL: Performed by: SURGERY

## 2024-08-29 PROCEDURE — 38525 BIOPSY/REMOVAL LYMPH NODES: CPT | Performed by: SURGERY

## 2024-08-29 PROCEDURE — 7100000011 HC PHASE II RECOVERY - ADDTL 15 MIN: Performed by: SURGERY

## 2024-08-29 PROCEDURE — A9520 TC99 TILMANOCEPT DIAG 0.5MCI: HCPCS | Performed by: SURGERY

## 2024-08-29 PROCEDURE — 3600000003 HC SURGERY LEVEL 3 BASE: Performed by: SURGERY

## 2024-08-29 PROCEDURE — 7100000010 HC PHASE II RECOVERY - FIRST 15 MIN: Performed by: SURGERY

## 2024-08-29 PROCEDURE — 78195 LYMPH SYSTEM IMAGING: CPT

## 2024-08-29 PROCEDURE — 19301 PARTIAL MASTECTOMY: CPT | Performed by: SURGERY

## 2024-08-29 PROCEDURE — 6360000002 HC RX W HCPCS: Performed by: NURSE ANESTHETIST, CERTIFIED REGISTERED

## 2024-08-29 PROCEDURE — 3700000001 HC ADD 15 MINUTES (ANESTHESIA): Performed by: SURGERY

## 2024-08-29 PROCEDURE — 3700000000 HC ANESTHESIA ATTENDED CARE: Performed by: SURGERY

## 2024-08-29 PROCEDURE — A4648 IMPLANTABLE TISSUE MARKER: HCPCS | Performed by: SURGERY

## 2024-08-29 PROCEDURE — 2500000003 HC RX 250 WO HCPCS: Performed by: NURSE ANESTHETIST, CERTIFIED REGISTERED

## 2024-08-29 PROCEDURE — 6360000002 HC RX W HCPCS: Performed by: SURGERY

## 2024-08-29 PROCEDURE — 2500000003 HC RX 250 WO HCPCS: Performed by: SURGERY

## 2024-08-29 PROCEDURE — 3600000013 HC SURGERY LEVEL 3 ADDTL 15MIN: Performed by: SURGERY

## 2024-08-29 PROCEDURE — 7100000000 HC PACU RECOVERY - FIRST 15 MIN: Performed by: SURGERY

## 2024-08-29 PROCEDURE — 88307 TISSUE EXAM BY PATHOLOGIST: CPT

## 2024-08-29 PROCEDURE — 6370000000 HC RX 637 (ALT 250 FOR IP)

## 2024-08-29 PROCEDURE — 2709999900 HC NON-CHARGEABLE SUPPLY: Performed by: SURGERY

## 2024-08-29 PROCEDURE — C1713 ANCHOR/SCREW BN/BN,TIS/BN: HCPCS | Performed by: SURGERY

## 2024-08-29 PROCEDURE — 76998 US GUIDE INTRAOP: CPT | Performed by: SURGERY

## 2024-08-29 RX ORDER — ONDANSETRON 4 MG/1
4 TABLET, FILM COATED ORAL 3 TIMES DAILY PRN
Qty: 15 TABLET | Refills: 0 | Status: SHIPPED | OUTPATIENT
Start: 2024-08-29

## 2024-08-29 RX ORDER — DIPHENHYDRAMINE HYDROCHLORIDE 50 MG/ML
12.5 INJECTION INTRAMUSCULAR; INTRAVENOUS
Status: DISCONTINUED | OUTPATIENT
Start: 2024-08-29 | End: 2024-08-29 | Stop reason: HOSPADM

## 2024-08-29 RX ORDER — CEFAZOLIN SODIUM 1 G/3ML
INJECTION, POWDER, FOR SOLUTION INTRAMUSCULAR; INTRAVENOUS
Status: DISCONTINUED
Start: 2024-08-29 | End: 2024-08-29 | Stop reason: HOSPADM

## 2024-08-29 RX ORDER — LIDOCAINE HYDROCHLORIDE 10 MG/ML
1 INJECTION, SOLUTION EPIDURAL; INFILTRATION; INTRACAUDAL; PERINEURAL
Status: DISCONTINUED | OUTPATIENT
Start: 2024-08-29 | End: 2024-08-29 | Stop reason: HOSPADM

## 2024-08-29 RX ORDER — DROPERIDOL 2.5 MG/ML
0.62 INJECTION, SOLUTION INTRAMUSCULAR; INTRAVENOUS
Status: DISCONTINUED | OUTPATIENT
Start: 2024-08-29 | End: 2024-08-29 | Stop reason: HOSPADM

## 2024-08-29 RX ORDER — NALOXONE HYDROCHLORIDE 0.4 MG/ML
INJECTION, SOLUTION INTRAMUSCULAR; INTRAVENOUS; SUBCUTANEOUS PRN
Status: DISCONTINUED | OUTPATIENT
Start: 2024-08-29 | End: 2024-08-29 | Stop reason: HOSPADM

## 2024-08-29 RX ORDER — SODIUM CHLORIDE 0.9 % (FLUSH) 0.9 %
5-40 SYRINGE (ML) INJECTION PRN
Status: DISCONTINUED | OUTPATIENT
Start: 2024-08-29 | End: 2024-08-29 | Stop reason: HOSPADM

## 2024-08-29 RX ORDER — EPHEDRINE SULFATE/0.9% NACL/PF 50 MG/5 ML
SYRINGE (ML) INTRAVENOUS PRN
Status: DISCONTINUED | OUTPATIENT
Start: 2024-08-29 | End: 2024-08-29 | Stop reason: SDUPTHER

## 2024-08-29 RX ORDER — PHENYLEPHRINE HCL IN 0.9% NACL 0.4MG/10ML
SYRINGE (ML) INTRAVENOUS PRN
Status: DISCONTINUED | OUTPATIENT
Start: 2024-08-29 | End: 2024-08-29 | Stop reason: SDUPTHER

## 2024-08-29 RX ORDER — SODIUM CHLORIDE, SODIUM LACTATE, POTASSIUM CHLORIDE, CALCIUM CHLORIDE 600; 310; 30; 20 MG/100ML; MG/100ML; MG/100ML; MG/100ML
INJECTION, SOLUTION INTRAVENOUS CONTINUOUS
Status: DISCONTINUED | OUTPATIENT
Start: 2024-08-29 | End: 2024-08-29 | Stop reason: HOSPADM

## 2024-08-29 RX ORDER — OXYCODONE HYDROCHLORIDE 5 MG/1
5 TABLET ORAL
Status: DISCONTINUED | OUTPATIENT
Start: 2024-08-29 | End: 2024-08-29 | Stop reason: HOSPADM

## 2024-08-29 RX ORDER — SODIUM CHLORIDE 9 MG/ML
INJECTION, SOLUTION INTRAVENOUS PRN
Status: DISCONTINUED | OUTPATIENT
Start: 2024-08-29 | End: 2024-08-29 | Stop reason: HOSPADM

## 2024-08-29 RX ORDER — ONDANSETRON 2 MG/ML
INJECTION INTRAMUSCULAR; INTRAVENOUS PRN
Status: DISCONTINUED | OUTPATIENT
Start: 2024-08-29 | End: 2024-08-29 | Stop reason: SDUPTHER

## 2024-08-29 RX ORDER — MECLIZINE HYDROCHLORIDE 25 MG/1
TABLET ORAL
Status: COMPLETED
Start: 2024-08-29 | End: 2024-08-29

## 2024-08-29 RX ORDER — MEPERIDINE HYDROCHLORIDE 25 MG/ML
12.5 INJECTION INTRAMUSCULAR; INTRAVENOUS; SUBCUTANEOUS EVERY 5 MIN PRN
Status: DISCONTINUED | OUTPATIENT
Start: 2024-08-29 | End: 2024-08-29 | Stop reason: HOSPADM

## 2024-08-29 RX ORDER — HYDROMORPHONE HYDROCHLORIDE 1 MG/ML
0.5 INJECTION, SOLUTION INTRAMUSCULAR; INTRAVENOUS; SUBCUTANEOUS EVERY 5 MIN PRN
Status: DISCONTINUED | OUTPATIENT
Start: 2024-08-29 | End: 2024-08-29 | Stop reason: HOSPADM

## 2024-08-29 RX ORDER — MECLIZINE HYDROCHLORIDE 25 MG/1
12.5 TABLET ORAL ONCE
Status: CANCELLED | OUTPATIENT
Start: 2024-08-29

## 2024-08-29 RX ORDER — OXYCODONE AND ACETAMINOPHEN 5; 325 MG/1; MG/1
1 TABLET ORAL EVERY 4 HOURS PRN
Qty: 15 TABLET | Refills: 0 | Status: SHIPPED | OUTPATIENT
Start: 2024-08-29 | End: 2024-09-01

## 2024-08-29 RX ORDER — WATER 10 ML/10ML
INJECTION INTRAMUSCULAR; INTRAVENOUS; SUBCUTANEOUS
Status: DISCONTINUED
Start: 2024-08-29 | End: 2024-08-29 | Stop reason: HOSPADM

## 2024-08-29 RX ORDER — FENTANYL CITRATE 50 UG/ML
INJECTION, SOLUTION INTRAMUSCULAR; INTRAVENOUS PRN
Status: DISCONTINUED | OUTPATIENT
Start: 2024-08-29 | End: 2024-08-29 | Stop reason: SDUPTHER

## 2024-08-29 RX ORDER — FENTANYL CITRATE 50 UG/ML
25 INJECTION, SOLUTION INTRAMUSCULAR; INTRAVENOUS EVERY 5 MIN PRN
Status: DISCONTINUED | OUTPATIENT
Start: 2024-08-29 | End: 2024-08-29 | Stop reason: HOSPADM

## 2024-08-29 RX ORDER — DEXAMETHASONE SODIUM PHOSPHATE 4 MG/ML
INJECTION, SOLUTION INTRA-ARTICULAR; INTRALESIONAL; INTRAMUSCULAR; INTRAVENOUS; SOFT TISSUE PRN
Status: DISCONTINUED | OUTPATIENT
Start: 2024-08-29 | End: 2024-08-29 | Stop reason: SDUPTHER

## 2024-08-29 RX ADMIN — DEXAMETHASONE SODIUM PHOSPHATE 8 MG: 4 INJECTION, SOLUTION INTRAMUSCULAR; INTRAVENOUS at 09:02

## 2024-08-29 RX ADMIN — FENTANYL CITRATE 25 MCG: 50 INJECTION, SOLUTION INTRAMUSCULAR; INTRAVENOUS at 09:15

## 2024-08-29 RX ADMIN — WATER 2000 MG: 1 INJECTION INTRAMUSCULAR; INTRAVENOUS; SUBCUTANEOUS at 09:00

## 2024-08-29 RX ADMIN — FENTANYL CITRATE 50 MCG: 50 INJECTION, SOLUTION INTRAMUSCULAR; INTRAVENOUS at 09:20

## 2024-08-29 RX ADMIN — SODIUM CHLORIDE, POTASSIUM CHLORIDE, SODIUM LACTATE AND CALCIUM CHLORIDE: 600; 310; 30; 20 INJECTION, SOLUTION INTRAVENOUS at 08:03

## 2024-08-29 RX ADMIN — PROPOFOL 75 MCG/KG/MIN: 10 INJECTION, EMULSION INTRAVENOUS at 09:00

## 2024-08-29 RX ADMIN — TILMANOCEPT 254 MICRO CURIE: KIT at 07:30

## 2024-08-29 RX ADMIN — PROPOFOL 150 MG: 10 INJECTION, EMULSION INTRAVENOUS at 08:58

## 2024-08-29 RX ADMIN — MECLIZINE HYDROCHLORIDE 25 MG: 25 TABLET ORAL at 08:20

## 2024-08-29 RX ADMIN — ONDANSETRON HYDROCHLORIDE 4 MG: 2 INJECTION, SOLUTION INTRAMUSCULAR; INTRAVENOUS at 09:48

## 2024-08-29 RX ADMIN — TILMANOCEPT 252 MICRO CURIE: KIT at 07:30

## 2024-08-29 RX ADMIN — Medication 80 MCG: at 09:09

## 2024-08-29 RX ADMIN — FENTANYL CITRATE 25 MCG: 50 INJECTION, SOLUTION INTRAMUSCULAR; INTRAVENOUS at 09:02

## 2024-08-29 RX ADMIN — Medication 5 MG: at 09:29

## 2024-08-29 RX ADMIN — Medication 5 MG: at 09:14

## 2024-08-29 RX ADMIN — LIDOCAINE HYDROCHLORIDE 80 MG: 20 INJECTION, SOLUTION EPIDURAL; INFILTRATION; INTRACAUDAL; PERINEURAL at 08:57

## 2024-08-29 ASSESSMENT — PAIN SCALES - GENERAL
PAINLEVEL_OUTOF10: 4

## 2024-08-29 ASSESSMENT — PAIN - FUNCTIONAL ASSESSMENT: PAIN_FUNCTIONAL_ASSESSMENT: 0-10

## 2024-08-29 ASSESSMENT — PAIN DESCRIPTION - LOCATION: LOCATION: BREAST

## 2024-08-29 ASSESSMENT — PAIN DESCRIPTION - DESCRIPTORS: DESCRIPTORS: ACHING

## 2024-08-29 ASSESSMENT — PAIN DESCRIPTION - ORIENTATION: ORIENTATION: RIGHT

## 2024-08-29 NOTE — OP NOTE
[facility]              [address] [place]  [address]                            OPERATIVE REPORT      PATIENT NAME: GERALDINE VANN            : 1950  MED REC NO: [id number]                       ROOM: [facility]  ACCOUNT NO: [id number]                       ADMIT DATE: 2024  PROVIDER: Basilia Langford MD    DATE OF SERVICE:  2024    PREOPERATIVE DIAGNOSES:  Right breast cancer, lower outer quadrant.    POSTOPERATIVE DIAGNOSES:  Right breast cancer, lower outer quadrant.    PROCEDURES PERFORMED:       1. Right breast ultrasound-guided lumpectomy.     2. Right deep axillary sentinel node biopsy.     3. Intraoperative margin assessment using RF spectroscopy, VeraForm suture placement.    SURGEON:  Basilia Langford MD    ASSISTANT:  Paty Mckee SA.    ANESTHESIA:  General.    ESTIMATED BLOOD LOSS:  Minimal.    SPECIMENS REMOVED:       1. Right axillary sentinel node #1.     2. Right breast lumpectomy.     3. Lateral margin.     4. Superior margin.     5. Medial margin.     6. Inferior margin.     7. Anterior margin.     8. Deep margin.    INTRAOPERATIVE FINDINGS:  Clip and lesion removed.  Node removed and sent for permanent.     COMPLICATIONS:  None.    IMPLANTS:  None.    INDICATIONS:  A 74-year-old female with right breast cancer, lower outer quadrant, who needed a lumpectomy, deep axillary sentinel node biopsy for surgical treatment.    DESCRIPTION OF PROCEDURE:  The patient was seen in the preop holding area where surgical site was marked by the surgeon and informed consent was obtained.  She was taken to the operating room and laid in supine position where general endotracheal anesthesia was induced.  Right breast prepped and draped in the usual fashion.  A time-out was performed.  Attention was turned to the right axilla where an inferior axillary hairline incision was made with a #15 blade.  Bovie cautery was used to dissect through the  36.9

## 2024-08-29 NOTE — ANESTHESIA POSTPROCEDURE EVALUATION
Department of Anesthesiology  Postprocedure Note    Patient: Carine Jones  MRN: [id number]  YOB: 1950  Date of evaluation: 8/29/2024    Procedure Summary       Date: 08/29/24 Room / Location: [facility] B4 / [facility] OR    Anesthesia Start: 0853 Anesthesia Stop: 1006    Procedure: RIGHT BREAST ULTRASOUND GUIDED LUMPECTOMY, RIGHT BREAST SENTINEL NODE BIOPSY (Right: Breast) Diagnosis:       Malignant neoplasm of right breast (HCC)      (Malignant neoplasm of right breast (HCC) [C50.911])    Surgeons: Basilia Langford MD Responsible Provider: Lurdes Rosario MD    Anesthesia Type: General, TIVA ASA Status: 2            Anesthesia Type: General, TIVA    Vikash Phase I: Vikash Score: 9    Vikash Phase II:      Anesthesia Post Evaluation    Patient location during evaluation: PACU  Patient participation: complete - patient participated  Level of consciousness: awake and alert  Pain score: 0  Airway patency: patent  Nausea & Vomiting: no vomiting and no nausea  Cardiovascular status: blood pressure returned to baseline and hemodynamically stable  Respiratory status: acceptable  Hydration status: stable  Multimodal analgesia pain management approach  Pain management: satisfactory to patient    No notable events documented.

## 2024-08-29 NOTE — DISCHARGE INSTRUCTIONS
Discharge Instructions from Dr. Langford    I will call you with the pathology results, typically within 1 week from today.  You may shower, but no hot tubs, swimming pools, or baths until your incision is healed.  No heavy lifting with the affected extremity (nothing greater than 5 pounds), and limit its use for the next 4-5 days.  You may use an ice pack for comfort for the next couple of days, but do not place ice directly on the skin.  Rather, use a towel or clothing to serve as a barrier between skin and ice to prevent injury.  If I placed a drain, follow the drain instructions provided, especially as you keep a record of the drain output.  Follow medication instructions carefully. No aspirin, ibuprofen aleve or celebrex for 5-7 days.  Wear surgical bra for 24 hours, then remove. Wear supportive bra at all times.  You will have bruising and swelling  Watch for signs of infection as listed below.  Redness  Swelling  Drainage from the incision or from your nipple that appears infected  Fever over 101.5 degrees for consecutive readings, or over 99.5 if you are currently undergoing chemotherapy.  Call our office (number is below) for a follow-up appointment.  If you have any problems, our phone number is [phone]     TAKE NARCOTIC PAIN MEDICATIONS WITH FOOD     Narcotics tend to be constipating, we suggest taking a stool softener such as Colace or Miralax (follow package instructions).    DO NOT DRIVE WHILE TAKING NARCOTIC PAIN MEDICATIONS.    DO NOT TAKE SLEEPING MEDICATIONS OR ANTIANXIETY MEDICATIONS WHILE TAKING NARCOTIC PAIN MEDICATIONS,  ESPECIALLY THE NIGHT OF ANESTHESIA!    CPAP PATIENTS BE SURE TO WEAR MACHINE WHENEVER NAPPING OR SLEEPING!    PATIENT INSTRUCTIONS:    After General Anesthesia or Intravenous Sedation, for 24 hours or while taking prescription Narcotics:        Someone should be with you for the next 24 hours.        For your own safety, a responsible adult must drive you home.  Limit your

## 2024-08-29 NOTE — INTERVAL H&P NOTE
Update History & Physical    The patient's History and Physical of August 7, 2024 was reviewed with the patient and I examined the patient. There was no change. The surgical site was confirmed by the patient and me.     Plan: The risks, benefits, expected outcome, and alternative to the recommended procedure have been discussed with the patient. Patient understands and wants to proceed with the procedure.     Electronically signed by Basilia Langford MD on 8/29/2024 at 7:21 AM

## 2024-08-29 NOTE — PERIOP NOTE
1004 Pt arrived to PACU. Pt drowsy but arrousable.  2 incisions on right breast CDI. Surgical bra on.  Ice pack placed to site.  Pt denies pain.    1015 Pt states pain in right breast is about 4/10 but does not want anything for pain.    1050 Pt alert and oriented.  VSS.  Two incisions on right breast CDI. Discharge instructions have been reviewed with . Pt wearing glasses.  Pt states she is ready to go home.  Pt walked to the bathroom and voided. Pt meets discharge criteria.

## 2024-08-29 NOTE — BRIEF OP NOTE
Brief Postoperative Note      Patient: Carine Jones  YOB: 1950  MRN: [id number]    Date of Procedure: 8/29/2024    Pre-Op Diagnosis Codes:      * Malignant neoplasm of right breast (HCC) [C50.911]    Post-Op Diagnosis: Same       Procedure(s):  RIGHT BREAST ULTRASOUND GUIDED LUMPECTOMY, RIGHT BREAST SENTINEL NODE BIOPSY    Surgeon(s):  Basilia Langfrod MD    Assistant:  Surgical Assistant: Paty Mckee    Anesthesia: General    Estimated Blood Loss (mL): Minimal    Complications: None    Specimens:   ID Type Source Tests Collected by Time Destination   1 : right axillary sentinel lymph node #1 Tissue Breast SURGICAL PATHOLOGY Basilia Langford MD 8/29/2024 0923    2 : right breast lumpectomy short stitch superior, long stitch lateral Tissue Breast SURGICAL PATHOLOGY Basilia Langford MD 8/29/2024 0935    3 : right breast lateral margin, stitch on new margin Tissue Breast SURGICAL PATHOLOGY Basilia Langford MD 8/29/2024 0938    4 : right breast superior margin, stitch on new margin Tissue Breast SURGICAL PATHOLOGY Basilia Langford MD 8/29/2024 0939    5 : right breast medial margin, stitch on new margin Tissue Breast SURGICAL PATHOLOGY Basilia Langford MD 8/29/2024 0941    6 : right breast inferior margin, stitch on new margin Tissue Breast SURGICAL PATHOLOGY Basilia Langford MD 8/29/2024 0941    7 : right breast anterior margin, stitch on new margin Tissue Breast SURGICAL PATHOLOGY Basilia Langford MD 8/29/2024 0942    8 : right breast deep margin, stitch on new margin Tissue Breast SURGICAL PATHOLOGY Basilia Langford MD 8/29/2024 0942        Implants:  * No implants in log *      Drains: * No LDAs found *    Findings:  Infection Present At Time Of Surgery (PATOS) (choose all levels that have infection present):  No infection present  Other Findings: node removed, clip and lesion removed          Electronically signed by Basilia Langford MD on 8/29/2024 at 9:56 AM

## 2024-08-29 NOTE — ANESTHESIA PRE PROCEDURE
Department of Anesthesiology  Preprocedure Note       Name:  Carine Jones   Age:  74 y.o.  :  1950                                          MRN:  [id number]         Date:  2024      Surgeon: Surgeon(s):  Basilia Langford MD    Procedure: Procedure(s):  RIGHT BREAST ULTRASOUND GUIDED LUMPECTOMY, RIGHT BREAST SENTINEL NODE BIOPSY    Medications prior to admission:   Prior to Admission medications    Medication Sig Start Date End Date Taking? Authorizing Provider   celecoxib (CELEBREX) 100 MG capsule Take 1 capsule by mouth daily 3/19/24  Yes Prisca Ball MD   rosuvastatin (CRESTOR) 20 MG tablet Take 1 tablet by mouth daily   Yes Prisca Ball MD   Calcium Carbonate (CALTRATE 600 PO) Take 1 tablet by mouth daily   Yes Prisca Ball MD   GLUCOSAMINE-CHONDROITIN PO Take 1 tablet by mouth daily   Yes Prisca Ball MD   aspirin 81 MG chewable tablet Take 1 tablet by mouth daily Prescribed by PCP   Yes Prisca Ball MD       Current medications:    Current Facility-Administered Medications   Medication Dose Route Frequency Provider Last Rate Last Admin   • ceFAZolin (ANCEF) 2,000 mg in sterile water 20 mL IV syringe  2,000 mg IntraVENous Once Basilia Langford MD       • lidocaine PF 1 % injection 1 mL  1 mL IntraDERmal Once PRN Lurdes Rosario MD       • lactated ringers IV soln infusion   IntraVENous Continuous Lurdes Rosario MD       • sodium chloride flush 0.9 % injection 5-40 mL  5-40 mL IntraVENous PRN Lurdes Rosario MD       • 0.9 % sodium chloride infusion   IntraVENous PRN Lurdes Rosario MD       • ceFAZolin (ANCEF) 1 g injection            • sterile water injection                Allergies:  No Known Allergies    Problem List:    Patient Active Problem List   Diagnosis Code   • Malignant neoplasm of right breast (HCC) C50.911       Past Medical History:        Diagnosis Date   • Hyperlipidemia    • Osteoarthritis    • PONV (postoperative

## 2024-08-29 NOTE — PERIOP NOTE
Carine ARIAS Pendleton  1950  [id number]    Situation:  Verbal report given from: Jeremiah MURRAY  Procedure: Procedure(s):  RIGHT BREAST ULTRASOUND GUIDED LUMPECTOMY, RIGHT BREAST SENTINEL NODE BIOPSY    Background:    Preoperative diagnosis: Malignant neoplasm of right breast (HCC) [C50.911]    Postoperative diagnosis: * No post-op diagnosis entered *    :  Dr. Langford    Assistant(s): Circulator: Tamra Tobar RN  Surgical Assistant: Paty Mckee Circulator: Paul Duron RN  Scrub Person First: Archana Johsi RN    Specimens:   ID Type Source Tests Collected by Time Destination   1 : right axillary sentinel lymph node #1 Tissue Breast SURGICAL PATHOLOGY Basilia Langford MD 8/29/2024 0923    2 : right breast lumpectomy short stitch superior, long stitch lateral Tissue Breast SURGICAL PATHOLOGY Basilia Langford MD 8/29/2024 0935    3 : right breast lateral margin, stitch on new margin Tissue Breast SURGICAL PATHOLOGY Basilia Langford MD 8/29/2024 0938    4 : right breast superior margin, stitch on new margin Tissue Breast SURGICAL PATHOLOGY Basilia Langford MD 8/29/2024 0939    5 : right breast medial margin, stitch on new margin Tissue Breast SURGICAL PATHOLOGY Basilia Langford MD 8/29/2024 0941    6 : right breast inferior margin, stitch on new margin Tissue Breast SURGICAL PATHOLOGY Basilia Langford MD 8/29/2024 0941    7 : right breast anterior margin, stitch on new margin Tissue Breast SURGICAL PATHOLOGY Basilia Langford MD 8/29/2024 0942    8 : right breast deep margin, stitch on new margin Tissue Breast SURGICAL PATHOLOGY Basilia Langford MD 8/29/2024 0942        Assessment:  Intra-procedure medications         Anesthesia gave intra-procedure sedation and medications, see anesthesia flow sheet     Intravenous fluids: LR@ KVO     Vital signs stable       Recommendation:    Permission to share finding with family

## 2024-08-29 NOTE — PERIOP NOTE
Air Warming blanket placed on pt; turned on for comfort    Permission received to review discharge instructions and discuss private health information with - [place]  and will have someone with them after discharge

## 2024-09-04 ENCOUNTER — TELEPHONE (OUTPATIENT)
Age: 74
End: 2024-09-04

## 2024-09-09 ENCOUNTER — CLINICAL DOCUMENTATION (OUTPATIENT)
Age: 74
End: 2024-09-09

## 2024-09-11 ENCOUNTER — CLINICAL DOCUMENTATION (OUTPATIENT)
Age: 74
End: 2024-09-11

## 2024-09-17 NOTE — PROGRESS NOTES
Carine Jones is a 74 y.o. female here for new patient appt for right breast cancer.  Referred by Dr Basilia Langford  8/29/24 right breast lumpectomy  9/25/24 appt with Dr Hidalgo   Pt here alone today.    1. Have you been to the ER, urgent care clinic since your last visit?  Hospitalized since your last visit? New Pt    2. Have you seen or consulted any other health care providers outside of the [facility] since your last visit?  Include any pap smears or colon screening. New Pt

## 2024-09-18 ENCOUNTER — OFFICE VISIT (OUTPATIENT)
Age: 74
End: 2024-09-18

## 2024-09-18 ENCOUNTER — TELEPHONE (OUTPATIENT)
Age: 74
End: 2024-09-18

## 2024-09-18 DIAGNOSIS — C50.911 MALIGNANT NEOPLASM OF RIGHT FEMALE BREAST, UNSPECIFIED ESTROGEN RECEPTOR STATUS, UNSPECIFIED SITE OF BREAST (HCC): Primary | ICD-10-CM

## 2024-09-18 PROCEDURE — 99024 POSTOP FOLLOW-UP VISIT: CPT | Performed by: SURGERY

## 2024-09-20 ENCOUNTER — OFFICE VISIT (OUTPATIENT)
Age: 74
End: 2024-09-20
Payer: MEDICARE

## 2024-09-20 ENCOUNTER — CLINICAL DOCUMENTATION (OUTPATIENT)
Age: 74
End: 2024-09-20

## 2024-09-20 VITALS
OXYGEN SATURATION: 97 % | SYSTOLIC BLOOD PRESSURE: 135 MMHG | HEART RATE: 66 BPM | WEIGHT: 148.8 LBS | BODY MASS INDEX: 23.91 KG/M2 | HEIGHT: 66 IN | TEMPERATURE: 97.8 F | DIASTOLIC BLOOD PRESSURE: 73 MMHG

## 2024-09-20 DIAGNOSIS — Z17.0 MALIGNANT NEOPLASM OF UPPER-OUTER QUADRANT OF RIGHT BREAST IN FEMALE, ESTROGEN RECEPTOR POSITIVE (HCC): Primary | ICD-10-CM

## 2024-09-20 DIAGNOSIS — C50.411 MALIGNANT NEOPLASM OF UPPER-OUTER QUADRANT OF RIGHT BREAST IN FEMALE, ESTROGEN RECEPTOR POSITIVE (HCC): Primary | ICD-10-CM

## 2024-09-20 PROCEDURE — 1036F TOBACCO NON-USER: CPT | Performed by: INTERNAL MEDICINE

## 2024-09-20 PROCEDURE — G8428 CUR MEDS NOT DOCUMENT: HCPCS | Performed by: INTERNAL MEDICINE

## 2024-09-20 PROCEDURE — 99205 OFFICE O/P NEW HI 60 MIN: CPT | Performed by: INTERNAL MEDICINE

## 2024-09-20 PROCEDURE — G8400 PT W/DXA NO RESULTS DOC: HCPCS | Performed by: INTERNAL MEDICINE

## 2024-09-20 PROCEDURE — 1126F AMNT PAIN NOTED NONE PRSNT: CPT | Performed by: INTERNAL MEDICINE

## 2024-09-20 PROCEDURE — 1123F ACP DISCUSS/DSCN MKR DOCD: CPT | Performed by: INTERNAL MEDICINE

## 2024-09-20 PROCEDURE — 3017F COLORECTAL CA SCREEN DOC REV: CPT | Performed by: INTERNAL MEDICINE

## 2024-09-20 PROCEDURE — G8420 CALC BMI NORM PARAMETERS: HCPCS | Performed by: INTERNAL MEDICINE

## 2024-09-20 PROCEDURE — 1090F PRES/ABSN URINE INCON ASSESS: CPT | Performed by: INTERNAL MEDICINE

## 2024-09-20 NOTE — PROGRESS NOTES
[facility]  at [facility]  [address], [address] [facility] [address], [address]  [place] [address]  [phone]        Oncology Consultation Note        Patient: Carine Jones MRN: [id number]  SSN: [id number]    YOB: 1950  Age: 74 y.o.  Sex: female      Subjective:      Carine Jones is a 74 y.o. female who I am seeing for a new diagnosis of right breast invasive breast cancer. This was noted in imaging. A biopsy of the breast revealed IDC Gr 1 ER + IL + Her 2 RUTH -ve. She saw Dr. Langford and then underwent a right breast lumpectomy on 08/29/2024. She comes in to discuss the next steps.         Review of Systems:    Constitutional: negative  Eyes: negative  Ears, Nose, Mouth, Throat, and Face: negative  Respiratory: negative  Cardiovascular: negative  Gastrointestinal: negative  Genitourinary:negative  Integument/Breast: negative  Hematologic/Lymphatic: negative  Musculoskeletal:negative  Neurological: negative        Past Medical History:   Diagnosis Date    Breast cancer (HCC) 07/11/2024    lumpectomy 8/29/24    Hyperlipidemia     Osteoarthritis     PONV (postoperative nausea and vomiting)      Past Surgical History:   Procedure Laterality Date    BREAST LUMPECTOMY Right 08/29/2024    RIGHT BREAST ULTRASOUND GUIDED LUMPECTOMY, RIGHT BREAST SENTINEL NODE BIOPSY performed by Basilia Langford MD at [facility] AMBULATORY OR    COLONOSCOPY      COLONOSCOPY N/A 03/14/2024    COLONOSCOPY DIAGNOSTIC performed by Aleksandr Bhatt MD at [facility] ENDOSCOPY    KNEE ARTHROPLASTY      KNEE ARTHROSCOPY Bilateral     torn meniscus repairs    TONSILLECTOMY        Family History   Problem Relation Age of Onset    Heart Disease Mother     Arthritis Mother     Asthma Mother     High Blood Pressure Mother     Colon Cancer Father     Heart Disease Father     Diabetes Father     High Cholesterol Father     High Blood Pressure Sister     High Cholesterol Sister     No

## 2024-10-05 NOTE — PROGRESS NOTES
NCCN Distress Thermometer    Date Screening Completed: 09/20/24    Screening Declined:  [] Yes    Number that best describes how much distress you've experienced in the past week, including today?  0 [] - No distress 1 []      2 []      3 []      4 []       5 []       6 []      7 []      8 []      9 []       10 [] - Extreme distress    PROBLEM LIST  Have you had concerns about any of the items below in the past week, including today?      Physical Concerns Practical Concerns   [] Pain [] Taking care of myself    [] Sleep [] Taking care of others    [] Fatigue [] Work   [] Tobacco use  [] School   [] Substance use  [] Housing   [] Memory or concentration [] Finances   [] Sexual health [] Insurance   [] Changes in eating  [] Transportation   [] Loss or change of physical abilities  []     [] Having enough food   Emotional Concerns [] Access to medicine   [] Worry or anxiety [] Treatment decisions   [] Sadness or depression    [] Loss of interest or enjoyment  Spiritual or Anglican Concerns   [] Grief or loss  [] Sense of meaning or purpose   [] Fear [] Changes in tanesha or beliefs   [] Loneliness  [] Death, dying, or afterlife   [] Anger [] Conflict between beliefs and cancer treatments    [] Changes in appearance [] Relationship with the sacred   [] Feelings of worthlessness or being a burden [] Ritual or dietary needs        Social Concerns     [] Relationship with spouse or partner     [] Relationship with children    [] Relationship with family members     [] Relationship with friends or coworkers     [] Communication with health care team     [] Ability to have children     [] Prejudice or discrimination        Other Concerns:     Patient received resource information and education:  [] Yes  [x] No

## 2024-12-27 ENCOUNTER — TELEPHONE (OUTPATIENT)
Age: 74
End: 2024-12-27

## 2024-12-27 NOTE — TELEPHONE ENCOUNTER
Pt called to cancel appt. Pt stated that she wants to cancel because she is not going to move forward with treatment or visit.

## (undated) DEVICE — TRAP FLUID BUFFALO FLTR

## (undated) DEVICE — CONTINU-FLO SOLUTION SET, 2 INJECTION SITES, MALE LUER LOCK ADAPTER WITH RETRACTABLE COLLAR, LARGE BORE STOPCOCK WITH ROTATING MALE LUER LOCK EXTENSION SET, 2 INJECTION SITES, MALE LUER LOCK ADAPTER WITH RETRACTABLE COLLAR: Brand: INTERLINK/CONTINU-FLO

## (undated) DEVICE — CUFF BLD PRSS AD CLTH SGL TB W/ BAYNT CONN ROUNDED CORNER

## (undated) DEVICE — GLOVE SURG SZ 65 L12IN FNGR THK79MIL GRN LTX FREE

## (undated) DEVICE — SPONGE GZ W4XL4IN COT 12 PLY TYP VII WVN C FLD DSGN STERILE

## (undated) DEVICE — ENDOSCOPIC KIT COMPLIANCE ENDOKIT

## (undated) DEVICE — SOLUTION LACTATED RINGERS INJECTION USP

## (undated) DEVICE — SYRINGE MED 10ML LUERLOCK TIP W/O SFTY DISP

## (undated) DEVICE — PROBE DTECT MARGIN F/LUMPECTOMY

## (undated) DEVICE — KENDALL DL ECG CABLE AND LEAD WIRE SYSTEM, 3-LEAD, SINGLE PATIENT USE: Brand: KENDALL

## (undated) DEVICE — MARKER TISS VERAFORM

## (undated) DEVICE — SUTURE PERMA-HAND SZ 2-0 L30IN NONABSORBABLE BLK L26MM SH K833H

## (undated) DEVICE — PENCIL SMK EVAC ALL IN 1 DSGN ENH VISIBILITY IMPROVED AIR

## (undated) DEVICE — TRAP ENDOSCP POLYP 2 CHMBR DRAWER TYP

## (undated) DEVICE — HYPODERMIC SAFETY NEEDLE: Brand: MONOJECT

## (undated) DEVICE — CHEST/BREAST-MRMCASU: Brand: MEDLINE INDUSTRIES, INC.

## (undated) DEVICE — ENDO CARRY-ON PROCEDURE KIT INCLUDES ENZYMATIC SPONGE, GAUZE, BIOHAZARD LABEL, TRAY, LUBRICANT, DIRTY SCOPE LABEL, WATER LABEL, TRAY, DRAWSTRING PAD, AND DEFENDO 4-PIECE KIT.: Brand: ENDO CARRY-ON PROCEDURE KIT

## (undated) DEVICE — SUTURE VICRYL SZ 2-0 L27IN ABSRB UD L26MM SH 1/2 CIR J417H

## (undated) DEVICE — ELECTRODE PT RET AD L9FT HI MOIST COND ADH HYDRGEL CORDED

## (undated) DEVICE — HYPODERMIC SAFETY NEEDLE: Brand: MAGELLAN

## (undated) DEVICE — PROVE COVER: Brand: UNBRANDED

## (undated) DEVICE — SOLIDIFIER MEDC 1200ML -- CONVERT TO 356117

## (undated) DEVICE — GLOVE SURG SZ 6 L12IN FNGR THK79MIL GRN LTX FREE

## (undated) DEVICE — IV START KIT: Brand: MEDLINE

## (undated) DEVICE — BRA SURG M FOR 36-39IN CHST LYCRA WIDE ADJ STRP FR HK LOOP

## (undated) DEVICE — TIP SUCT TRNSPAR RIB SURF STD BLB RIG NVENT W/ 5IN1 CONN DYND50138] MEDLINE INDUSTRIES INC]

## (undated) DEVICE — LIQUIBAND RAPID ADHESIVE 36/CS 0.8ML: Brand: MEDLINE

## (undated) DEVICE — CATHETER IV 22GA L1IN TEF FEP STR HUB INTROCAN SFTY

## (undated) DEVICE — SUTURE VICRYL + SZ 3-0 L27IN ABSRB UD L26MM SH 1/2 CIR VCP416H

## (undated) DEVICE — SUTURE MONOCRYL SZ 4-0 L27IN ABSRB UD L19MM PS-2 1/2 CIR PRIM Y426H

## (undated) DEVICE — SET GRAV CK VLV NEEDLESS ST 3 GANGED 4WAY STPCOCK HI FLO 10

## (undated) DEVICE — 1200 GUARD II KIT W/5MM TUBE W/O VAC TUBE: Brand: GUARDIAN

## (undated) DEVICE — BLADE ES ELASTOMERIC COAT INSUL DURABLE BEND UPTO 90DEG

## (undated) DEVICE — 3M™ TEGADERM™ TRANSPARENT FILM DRESSING FRAME STYLE, 1624W, 2-3/8 IN X 2-3/4 IN (6 CM X 7 CM), 100/CT 4CT/CASE: Brand: 3M™ TEGADERM™